# Patient Record
Sex: FEMALE | Race: BLACK OR AFRICAN AMERICAN | Employment: UNEMPLOYED | URBAN - NONMETROPOLITAN AREA
[De-identification: names, ages, dates, MRNs, and addresses within clinical notes are randomized per-mention and may not be internally consistent; named-entity substitution may affect disease eponyms.]

---

## 2022-10-26 ENCOUNTER — HOSPITAL ENCOUNTER (EMERGENCY)
Age: 32
Discharge: HOME | End: 2022-10-26
Payer: MEDICAID

## 2022-10-26 VITALS
TEMPERATURE: 97.9 F | WEIGHT: 180 LBS | HEART RATE: 68 BPM | RESPIRATION RATE: 16 BRPM | HEIGHT: 67 IN | SYSTOLIC BLOOD PRESSURE: 126 MMHG | OXYGEN SATURATION: 100 % | BODY MASS INDEX: 28.25 KG/M2 | DIASTOLIC BLOOD PRESSURE: 85 MMHG

## 2022-10-26 DIAGNOSIS — F41.1 ANXIETY STATE: ICD-10-CM

## 2022-10-26 DIAGNOSIS — G40.909 SEIZURE DISORDER (HCC): Primary | ICD-10-CM

## 2022-10-26 LAB
ALBUMIN SERPL-MCNC: 4.4 G/DL (ref 3.5–5.1)
ALP BLD-CCNC: 45 U/L (ref 38–126)
ALT SERPL-CCNC: 10 U/L (ref 11–66)
ANION GAP SERPL CALCULATED.3IONS-SCNC: 14 MEQ/L (ref 8–16)
AST SERPL-CCNC: 18 U/L (ref 5–40)
BASOPHILS # BLD: 1.5 %
BASOPHILS ABSOLUTE: 0.1 THOU/MM3 (ref 0–0.1)
BILIRUB SERPL-MCNC: 0.7 MG/DL (ref 0.3–1.2)
BUN BLDV-MCNC: 7 MG/DL (ref 7–22)
CALCIUM SERPL-MCNC: 9.4 MG/DL (ref 8.5–10.5)
CHLORIDE BLD-SCNC: 105 MEQ/L (ref 98–111)
CO2: 20 MEQ/L (ref 23–33)
CREAT SERPL-MCNC: 0.7 MG/DL (ref 0.4–1.2)
EKG ATRIAL RATE: 67 BPM
EKG P AXIS: 62 DEGREES
EKG P-R INTERVAL: 124 MS
EKG Q-T INTERVAL: 414 MS
EKG QRS DURATION: 88 MS
EKG QTC CALCULATION (BAZETT): 437 MS
EKG R AXIS: 29 DEGREES
EKG T AXIS: 43 DEGREES
EKG VENTRICULAR RATE: 67 BPM
EOSINOPHIL # BLD: 1.2 %
EOSINOPHILS ABSOLUTE: 0 THOU/MM3 (ref 0–0.4)
ERYTHROCYTE [DISTWIDTH] IN BLOOD BY AUTOMATED COUNT: 13.9 % (ref 11.5–14.5)
ERYTHROCYTE [DISTWIDTH] IN BLOOD BY AUTOMATED COUNT: 44.1 FL (ref 35–45)
GFR SERPL CREATININE-BSD FRML MDRD: > 60 ML/MIN/1.73M2
GLUCOSE BLD-MCNC: 91 MG/DL (ref 70–108)
HCT VFR BLD CALC: 38.8 % (ref 37–47)
HEMOGLOBIN: 12.7 GM/DL (ref 12–16)
IMMATURE GRANS (ABS): 0 THOU/MM3 (ref 0–0.07)
IMMATURE GRANULOCYTES: 0 %
LYMPHOCYTES # BLD: 22 %
LYMPHOCYTES ABSOLUTE: 0.7 THOU/MM3 (ref 1–4.8)
MCH RBC QN AUTO: 28.3 PG (ref 26–33)
MCHC RBC AUTO-ENTMCNC: 32.7 GM/DL (ref 32.2–35.5)
MCV RBC AUTO: 86.6 FL (ref 81–99)
MONOCYTES # BLD: 4.7 %
MONOCYTES ABSOLUTE: 0.2 THOU/MM3 (ref 0.4–1.3)
NUCLEATED RED BLOOD CELLS: 0 /100 WBC
OSMOLALITY CALCULATION: 275.1 MOSMOL/KG (ref 275–300)
PLATELET # BLD: 240 THOU/MM3 (ref 130–400)
PMV BLD AUTO: 12.2 FL (ref 9.4–12.4)
POTASSIUM REFLEX MAGNESIUM: 4.1 MEQ/L (ref 3.5–5.2)
RBC # BLD: 4.48 MILL/MM3 (ref 4.2–5.4)
SEG NEUTROPHILS: 70.6 %
SEGMENTED NEUTROPHILS ABSOLUTE COUNT: 2.4 THOU/MM3 (ref 1.8–7.7)
SODIUM BLD-SCNC: 139 MEQ/L (ref 135–145)
TOTAL PROTEIN: 7.8 G/DL (ref 6.1–8)
TROPONIN T: < 0.01 NG/ML
TSH SERPL DL<=0.05 MIU/L-ACNC: 0.73 UIU/ML (ref 0.4–4.2)
WBC # BLD: 3.4 THOU/MM3 (ref 4.8–10.8)

## 2022-10-26 PROCEDURE — 84443 ASSAY THYROID STIM HORMONE: CPT

## 2022-10-26 PROCEDURE — 85025 COMPLETE CBC W/AUTO DIFF WBC: CPT

## 2022-10-26 PROCEDURE — 84484 ASSAY OF TROPONIN QUANT: CPT

## 2022-10-26 PROCEDURE — 99284 EMERGENCY DEPT VISIT MOD MDM: CPT

## 2022-10-26 PROCEDURE — 93010 ELECTROCARDIOGRAM REPORT: CPT | Performed by: INTERNAL MEDICINE

## 2022-10-26 PROCEDURE — 80053 COMPREHEN METABOLIC PANEL: CPT

## 2022-10-26 PROCEDURE — 93005 ELECTROCARDIOGRAM TRACING: CPT | Performed by: PHYSICIAN ASSISTANT

## 2022-10-26 PROCEDURE — 36415 COLL VENOUS BLD VENIPUNCTURE: CPT

## 2022-10-26 PROCEDURE — 80177 DRUG SCRN QUAN LEVETIRACETAM: CPT

## 2022-10-26 RX ORDER — LEVETIRACETAM 1000 MG/1
1000 TABLET ORAL 2 TIMES DAILY
COMMUNITY
End: 2022-11-10 | Stop reason: SDUPTHER

## 2022-10-26 RX ORDER — LORAZEPAM 0.5 MG/1
0.5 TABLET ORAL EVERY 6 HOURS PRN
COMMUNITY
End: 2022-10-26 | Stop reason: SDUPTHER

## 2022-10-26 RX ORDER — LORAZEPAM 0.5 MG/1
0.5 TABLET ORAL EVERY 6 HOURS PRN
Qty: 6 TABLET | Refills: 0 | Status: SHIPPED | OUTPATIENT
Start: 2022-10-26 | End: 2022-10-29

## 2022-10-26 ASSESSMENT — ENCOUNTER SYMPTOMS
ABDOMINAL PAIN: 0
COUGH: 0
NAUSEA: 0
SHORTNESS OF BREATH: 0
VOMITING: 0
SORE THROAT: 0
DIARRHEA: 0
EYES NEGATIVE: 1

## 2022-10-26 ASSESSMENT — PAIN SCALES - GENERAL: PAINLEVEL_OUTOF10: 0

## 2022-10-26 NOTE — ED NOTES
Pt continues restful on cart w/family at bedside. Denies current needs or concerns.       Carter James, BANDAR  10/26/22 6289

## 2022-10-26 NOTE — ED NOTES
Called to pt room- states she feels better and she is tired of being here. She is ready to leave.  Will update provider     Ros Cortez RN  10/26/22 8956

## 2022-10-26 NOTE — DISCHARGE INSTRUCTIONS
Take Keppra as prescribed, take ativan 1-2 tablets q 6 hours as needed. Follow up with PCP in the next day for re-evaluation and management. Follow up to Neurology in the next week for further evaluation and management of seizure disorder. Recommend no driving, swimming, climbing until follow up with neurology.

## 2022-10-26 NOTE — ED TRIAGE NOTES
Pt to rm 23 per EMS for panic attack/anxiety. Pt states she kicked a rock after driving her son to school that triggered her panic attack. States she doesn't sleep well- took ativan this morning around 0300 after she stated she \"jumped\" awake- states her heart was pounding and she was having another attack. On arrival pt appears in no acute distress, VSS. States she has an appt tomorrow w/ a new PCP since she just moved here from [place] and a neuro appt next month.  Pt took her morning dose Keppra on arrival.

## 2022-10-26 NOTE — ED PROVIDER NOTES
[facility] [address] [address] EMERGENCY DEPT    EMERGENCY MEDICINE     Pt Name: Robin Dickey  MRN: [id number]  [place] 1990  Date of evaluation: 10/26/2022  Provider: Flakito Forrest PA-C    CHIEF COMPLAINT       Chief Complaint   Patient presents with    Panic Attack     States she kicked a rock and that triggered her anxiety/panic attack this morning       HISTORY OF PRESENT ILLNESS    Robin Dickey is a pleasant 33 y.o. female who presents to the emergency department for panic attack. Patient states that she has history of seizure disorder she believes is epilepsy as well as anxiety. Patient states she recently moved from [place] up to this area 2 months ago. She has yet to establish a new PCP and neurologist although she does have appointment set up to see her PCP tomorrow and a neurologist in the month of November. Patient is currently taking Keppra 1000 milligrams twice daily along with Ativan 0.5 mg every 6 hours as needed for anxiety. Patient states that very frequently she will have these feelings that she describes as \"my body is feeling shocks from the inside out\". She states this is difficult to control at times and very simple things like kicking a rock it may trigger it. She states typically the 1 tablet of Ativan will control these feelings but not all the time. She states she took a tablet this morning approximately 3 to 4 hours ago which did not seem to help. She also took her Keppra this morning. She has no complaints of chest pain, shortness of breath, weakness or paresthesias to the extremities, headaches, vision changes. Triage notes and Nursing notes were reviewed by myself. Any discrepancies are addressed above. PAST MEDICAL HISTORY     Past Medical History:   Diagnosis Date    Anxiety     Depression     Seizures ([facility] [address]        SURGICAL HISTORY     History reviewed. No pertinent surgical history.     CURRENT MEDICATIONS       Discharge Medication List as of 10/26/2022 12:38 PM        CONTINUE these medications which have NOT CHANGED    Details   levETIRAcetam (KEPPRA) 1000 MG tablet Take 1,000 mg by mouth 2 times dailyHistorical Med             ALLERGIES     Patient has no known allergies. FAMILY HISTORY     History reviewed. No pertinent family history. SOCIAL HISTORY       Social History     Socioeconomic History    Marital status: Single     Spouse name: None    Number of children: None    Years of education: None    Highest education level: None   Tobacco Use    Smoking status: Never    Smokeless tobacco: Never   Substance and Sexual Activity    Alcohol use: Never    Drug use: Yes     Types: Marijuana (Weed)     Comment: daily for anxiety       REVIEW OF SYSTEMS     Review of Systems   Constitutional:  Negative for chills and fever. HENT:  Negative for congestion, ear pain and sore throat. Eyes: Negative. Respiratory:  Negative for cough and shortness of breath. Cardiovascular:  Negative for chest pain and palpitations. Gastrointestinal:  Negative for abdominal pain, diarrhea, nausea and vomiting. Endocrine: Negative. Genitourinary:  Negative for dysuria and frequency. Musculoskeletal:  Negative for myalgias and neck pain. Skin:  Negative for rash. Neurological:  Positive for seizures. Negative for dizziness, light-headedness and headaches. Hematological: Negative. Psychiatric/Behavioral:  The patient is nervous/anxious. All other systems reviewed and are negative. Except as noted above the remainder of the review of systems was reviewed and is. SCREENINGS        Sayda Coma Scale  Eye Opening: Spontaneous  Best Verbal Response: Oriented  Best Motor Response: Obeys commands  [place] Coma Scale Score: 15                 PHYSICAL EXAM     INITIAL VITALS:  height is 5' 7\" (1.702 m) and weight is 180 lb (81.6 kg). Her oral temperature is 97.9 °F (36.6 °C). Her blood pressure is 126/85 and her pulse is 68.  Her respiration is 16 and oxygen saturation is 100%. Physical Exam  Vitals and nursing note reviewed. Exam conducted with a chaperone present. Constitutional:       General: She is not in acute distress. Appearance: Normal appearance. She is normal weight. She is not ill-appearing, toxic-appearing or diaphoretic. HENT:      Head: Normocephalic and atraumatic. Right Ear: External ear normal.      Left Ear: External ear normal.      Nose: Nose normal.      Mouth/Throat:      Mouth: Mucous membranes are moist.   Eyes:      Extraocular Movements: Extraocular movements intact. Pupils: Pupils are equal, round, and reactive to light. Cardiovascular:      Rate and Rhythm: Regular rhythm. Tachycardia present. Pulses: Normal pulses. Heart sounds: Normal heart sounds. No murmur heard. No friction rub. No gallop. Pulmonary:      Effort: Pulmonary effort is normal. No respiratory distress. Breath sounds: Normal breath sounds. Abdominal:      General: Bowel sounds are normal. There is no distension. Palpations: Abdomen is soft. Tenderness: There is no abdominal tenderness. Musculoskeletal:         General: Normal range of motion. Cervical back: Normal range of motion and neck supple. Skin:     General: Skin is warm and dry. Capillary Refill: Capillary refill takes less than 2 seconds. Neurological:      Mental Status: She is alert and oriented to person, place, and time. GCS: GCS eye subscore is 4. GCS verbal subscore is 5. GCS motor subscore is 6. Psychiatric:         Mood and Affect: Mood is anxious. Behavior: Behavior normal.         Thought Content:  Thought content normal.       DIFFERENTIAL DIAGNOSIS:   Differential diagnoses are discussed    DIAGNOSTIC RESULTS     EKG:(none if blank)  All EKGs are interpreted by the Emergency Department Physician who either signs or Co-signs this chart in the absence of a cardiologist.      Component Ref Range & Units 10/26/22 1111 Ventricular Rate BPM 67    Atrial Rate BPM 67    P-R Interval ms 124    QRS Duration ms 88    Q-T Interval ms 414    QTc Calculation (Bazett) ms 437    P Axis degrees 62    R Axis degrees 29    T Axis degrees 43    Resulting Agency  [facility] MUSE           Narrative & Impression    Normal sinus rhythm  Normal ECG  No previous ECGs available  Confirmed by Barbara Das ([id number]) on 10/26/2022 12:08:14 PM             RADIOLOGY: (none if blank)   I directly visualized the following images and reviewed the radiologist interpretations. Interpretation per the Radiologist below, if available at the time of this note:  No orders to display       LABS:   Labs Reviewed   CBC WITH AUTO DIFFERENTIAL - Abnormal; Notable for the following components:       Result Value    WBC 3.4 (*)     Lymphocytes Absolute 0.7 (*)     Monocytes Absolute 0.2 (*)     All other components within normal limits   COMPREHENSIVE METABOLIC PANEL W/ REFLEX TO MG FOR LOW K - Abnormal; Notable for the following components:    CO2 20 (*)     ALT 10 (*)     All other components within normal limits   TSH WITH REFLEX   TROPONIN   GLOMERULAR FILTRATION RATE, ESTIMATED   ANION GAP   OSMOLALITY   LEVETIRACETAM LEVEL       All other labs were within normal range or not returned as of this dictation. Please note, any cultures that may have been sent were not resulted at the time of this patient visit. EMERGENCY DEPARTMENT COURSE:   Vitals:    Vitals:    10/26/22 0933 10/26/22 0942 10/26/22 1127   BP: 138/88  126/85   Pulse: 98 94 68   Resp: 16  16   Temp: 97.9 °F (36.6 °C)     TempSrc: Oral     SpO2: 100%  100%   Weight: 180 lb (81.6 kg)     Height: 5' 7\" (1.702 m)       7:09 PM EDT: The patient was seen and evaluated. PROCEDURES: (None if blank)  Procedures         ED Medications administered this visit:  Medications - No data to display    MDM:  Patient is 41-year-old female who came to the ED to be evaluated for anxiety, seizure disorder.   Appropriate testing/imaging of CBC, CMP, troponin, TSH, levetiracetam level EKG was done based on the patient's initial complaints, history, and physical exam.   Pertinent results were none. When patient was initially been evaluated she seemed very anxious and her heart rate was in the low 100s. Patient states that her body feels like it needed to jump or be shocked from the inside out. Patient had an episode where her heart rate went up to 140s for less than 20 seconds and then drop back down into the 70s. After her heart rate was in the 70s patient no longer appeared anxious and was at calm and rest.  Believe this may be more related to her seizure disorder. Patient did take another half tablet of her own 0.5 mg Ativan and was observed in the ED. Patient states she is back to her baseline and no current concerns or symptoms. Discussed with patient this is likely related to her seizure disorder and recommend follow-up with neurologist in the next week for reevaluation and possible change in her medication management. We will see her PCP tomorrow for reevaluation as well. If any worsening symptoms develop to the ED for reevaluation. Patient was seen independently by myself. The patient's final impression and disposition and plan was determined by myself. Strict return precautions and follow up instructions were discussed with the patient prior to discharge, with which the patient agrees. Physical assessment findings, diagnostic testing(s) if applicable, and vital signs reviewed with patient/patient representative. Questions answered. Medications as directed, including OTC medications for supportive care. Education provided on medications. Differential diagnosis(s) discussed with patient/patient representative. Home care/self care instructions reviewed with patient/patient representative. Patient is to follow-up with family care provider in 1 days if no improvement.   Patient is to go to the emergency department if symptoms worsen. Patient/patient representative is aware of care plan, questions answered, verbalizes understanding and is in agreement. CRITICAL CARE:   None    CONSULTS:  None    PROCEDURES:  None    FINAL IMPRESSION      1. Seizure disorder ([address]    2.  Anxiety state          DISPOSITION/PLAN   Discharge home    PATIENT REFERRED TO:  PCP    In 1 day  For follow up    [address] [facility] [address]  [address] [facility] [place] [address]  In 1 week      [facility] [address] [address] EMERGENCY DEPT  [address]  [phone]  In 2 days  If symptoms worsen      DISCHARGEMEDICATIONS:  Discharge Medication List as of 10/26/2022 12:38 PM               (Please note that portions of this note were completed with a voice recognition program.  Efforts were made to edit the dictations but occasionallywords are mis-transcribed.)      Mitch Yun PA-C(electronically signed)  Physician Associate, Emergency Department        Mitch Yun PA-C  10/26/22 5932

## 2022-10-28 LAB — KEPPRA: 23 UG/ML (ref 10–40)

## 2022-11-10 ENCOUNTER — OFFICE VISIT (OUTPATIENT)
Dept: INTERNAL MEDICINE CLINIC | Age: 32
End: 2022-11-10
Payer: MEDICAID

## 2022-11-10 VITALS
HEART RATE: 92 BPM | SYSTOLIC BLOOD PRESSURE: 110 MMHG | BODY MASS INDEX: 22.51 KG/M2 | HEIGHT: 67 IN | WEIGHT: 143.4 LBS | TEMPERATURE: 98 F | DIASTOLIC BLOOD PRESSURE: 70 MMHG

## 2022-11-10 DIAGNOSIS — F41.0 PANIC DISORDER WITHOUT AGORAPHOBIA: ICD-10-CM

## 2022-11-10 DIAGNOSIS — Z86.69 HISTORY OF SEIZURE DISORDER: Primary | ICD-10-CM

## 2022-11-10 PROCEDURE — 99204 OFFICE O/P NEW MOD 45 MIN: CPT | Performed by: STUDENT IN AN ORGANIZED HEALTH CARE EDUCATION/TRAINING PROGRAM

## 2022-11-10 RX ORDER — LORAZEPAM 0.5 MG/1
TABLET ORAL
Qty: 10 TABLET | Refills: 0 | Status: SHIPPED | OUTPATIENT
Start: 2022-11-10 | End: 2022-12-10

## 2022-11-10 RX ORDER — FLUVOXAMINE MALEATE 100 MG
100 TABLET ORAL NIGHTLY
Qty: 30 TABLET | Refills: 1 | Status: SHIPPED | OUTPATIENT
Start: 2022-11-10

## 2022-11-10 RX ORDER — LEVETIRACETAM 1000 MG/1
1000 TABLET ORAL 2 TIMES DAILY
Qty: 60 TABLET | Refills: 3 | Status: SHIPPED | OUTPATIENT
Start: 2022-11-10

## 2022-11-10 RX ORDER — LORAZEPAM 0.5 MG/1
0.5 TABLET ORAL EVERY 6 HOURS PRN
COMMUNITY
End: 2022-11-10 | Stop reason: ALTCHOICE

## 2022-11-10 SDOH — ECONOMIC STABILITY: FOOD INSECURITY: WITHIN THE PAST 12 MONTHS, THE FOOD YOU BOUGHT JUST DIDN'T LAST AND YOU DIDN'T HAVE MONEY TO GET MORE.: NEVER TRUE

## 2022-11-10 SDOH — ECONOMIC STABILITY: FOOD INSECURITY: WITHIN THE PAST 12 MONTHS, YOU WORRIED THAT YOUR FOOD WOULD RUN OUT BEFORE YOU GOT MONEY TO BUY MORE.: NEVER TRUE

## 2022-11-10 ASSESSMENT — PATIENT HEALTH QUESTIONNAIRE - PHQ9
SUM OF ALL RESPONSES TO PHQ QUESTIONS 1-9: 2
SUM OF ALL RESPONSES TO PHQ QUESTIONS 1-9: 2
1. LITTLE INTEREST OR PLEASURE IN DOING THINGS: 1
2. FEELING DOWN, DEPRESSED OR HOPELESS: 1
SUM OF ALL RESPONSES TO PHQ QUESTIONS 1-9: 2
SUM OF ALL RESPONSES TO PHQ QUESTIONS 1-9: 2
SUM OF ALL RESPONSES TO PHQ9 QUESTIONS 1 & 2: 2

## 2022-11-10 ASSESSMENT — SOCIAL DETERMINANTS OF HEALTH (SDOH): HOW HARD IS IT FOR YOU TO PAY FOR THE VERY BASICS LIKE FOOD, HOUSING, MEDICAL CARE, AND HEATING?: NOT VERY HARD

## 2022-11-10 NOTE — PROGRESS NOTES
[facility] Internal Medicine  [facility] [address], Andreea Amador  Dept: [phone]  Dept Fax: [phone]    This is a new patient, here for an evaluation of the following chief complaint(s):  New Patient (Establish care - panic attack / anxiety , seizure disorder / patient was in ER 10/26)    ASSESSMENT AND PLAN     1. History of seizure disorder    2. Panic disorder without agoraphobia      # History of Seizures -Patient reports seizures occurred when she was in high school. Was managed by a Neurologist in [place]. She has been on Keppra therapy. No report of recent breakthrough seizures. Prior Tx/Therapy:   Current Tx/Therapy: Keppra 1,000 mg BID    -She was supposed to establish care here with [facility] neurology with her first appointment being on 11/9/2022. Per chart review this appointment was canceled by the patient.  -Patient however states the neurology office had canceled the appointment and told her that the next available appointment is in January 2023. Patient does not have a follow-up appointment per chart review.  -Heavily encouraged patient to establish care with neurology here for better monitoring and control of her seizure disorder  -Patient only had 2 to 3 pills left and so I will prescribe the Keppra with refills to last her until she goes to neurology    Plan: Refills sent for Keppra 1000 mg BID. Patient advised to schedule another visit to establish care with Neurology. # Panic Disorder w/o Agoraphobia -patient reports having panic attacks 2-3 times a month. Reports constant high levels of anxiety. She states that she was prescribed Ativan while in [place]. She was given a 3-day supply of Ativan after visiting the ED on 10/26/2022. Prior Tx/Therapy:   Current Tx/Therapy: Ativan 0.5 mg PRN    -PDMP reviewed  -Patient heavily encouraged to establish care here with psychiatry and/or [facility].   Discussed with the patient that I will only prescribe 10 pills/month and that this should be taken only when she is undergoing a panic attack. Patient verbalized understanding and is in agreement with this plan. -Furthermore I discussed that SSRI therapy is the mainstay of treatment in patients with underlying panic disorder/anxiety. Discussed fluvoxamine with the patient. Side effects of SSRI therapy discussed. Patient is in agreement and verbalized understanding of starting SSRI therapy with fluvoxamine. Plan: Patient will start Fluvoxamine 100 mg to be taken nightly. Will prescribe Ativan, 0.5 mg tablets, total monthly supply of 10 tablets, to be taken as needed for panic attacks. New Prescriptions    FLUVOXAMINE (LUVOX) 100 MG TABLET    Take 1 tablet by mouth nightly    LORAZEPAM (ATIVAN) 0.5 MG TABLET    Take 1 tablet as needed for panic attacks     The risks and benefits of my recommendations, as well as other treatment options (if indicated) were discussed with the patient and boyfriend today. Questions were answered. Follow up: 1 month and as needed. MARGO Mart (1990) is a pleasant 33 y.o. female here for establishment of new care. Subjective  Patient and her boyfriend recently moved here from [address] [place] [address]  Seizure disoder - says that she gets mini seizures at times. Started in high school. Saw a neurologist in [address] [place] [address]. Missed the appt yestereday. She did not reschedule yet. Gets panic attacks when she thinks too much. Gets 1-2 panic attacks a month. PMH, surgical, family and social history reviewed with patient  No recurrent headaches, no CP. Denies symptoms of shortness of breath, abdominal pain, N/V/C/D.   Patient advised to schedule appointments with both neurology and with psychiatry    Current Medications:  Outpatient Medications Marked as Taking for the 11/10/22 encounter (Office Visit) with Wilda Craven MD   Medication Sig Dispense Refill    levETIRAcetam (KEPPRA) 1000 MG tablet Take 1 tablet by mouth 2 times daily 60 tablet 3    fluvoxaMINE (LUVOX) 100 MG tablet Take 1 tablet by mouth nightly 30 tablet 1    LORazepam (ATIVAN) 0.5 MG tablet Take 1 tablet as needed for panic attacks 10 tablet 0     PMH: has a past medical history of Anxiety, Depression, and Seizures ([facility] [address] PSH: has no past surgical history on file. FH:family history is not on file. SH: reports that she has never smoked. She has never used smokeless tobacco. She reports current drug use. Drug: Marijuana Braxton Goldberg). She reports that she does not drink alcohol. Allergies:has No Known Allergies. PHYSICAL EXAMINATION     Vitals:    11/10/22 1527   BP: 110/70   Pulse: 92   Temp: 98 °F (36.7 °C)     Body mass index is 22.46 kg/m². General appearance: alert, well appearing, and in no distress. Head; normocephalic, atraumatic. DOT. ENT- ENT exam normal, no neck nodes or sinus tenderness. Oropharyngeal exam - mucous membranes moist, pharynx normal without lesions. Neck exam - supple, no significant adenopathy. CVS exam: normal rate, regular rhythm, normal S1, S2, no murmurs, rubs, clicks or gallops. Chest: clear to auscultation, no wheezes, rales or rhonchi, symmetric air entry. Abdominal exam: soft, nontender, nondistended, no masses or organomegaly. Exam of extremities: peripheral pulses normal, no pedal edema, no clubbing or cyanosis  Musculoskeletal exam: no joint tenderness, deformity or swelling. Neurological exam reveals alert, oriented, normal speech, no focal findings or movement disorder noted. Skin exam - normal coloration and turgor, no rashes, no suspicious skin lesions noted. Mental Status: alert, oriented to person, place, and time. MOST RECENT DIAGNOSTIC DATA     The Following Labs Were Reviewed Today:  N/A    No results found for this or any previous visit (from the past 24 hour(s)). Radiology last 30 days:  No results found.     Electronically signed by Jesusita Gomez MD on 11/10/22 at 7:09 PM EST  Internal Medicine Resident PGY-3  [facility]  Neerujody Van, [address]  Case and plan reviewed with attending physician, Dr. Dago Chacon, DO

## 2023-01-07 DIAGNOSIS — F41.0 PANIC DISORDER WITHOUT AGORAPHOBIA: ICD-10-CM

## 2023-01-09 RX ORDER — FLUVOXAMINE MALEATE 100 MG
100 TABLET ORAL NIGHTLY
Qty: 30 TABLET | Refills: 1 | OUTPATIENT
Start: 2023-01-09

## 2023-02-08 DIAGNOSIS — F41.0 PANIC DISORDER WITHOUT AGORAPHOBIA: ICD-10-CM

## 2023-02-09 RX ORDER — FLUVOXAMINE MALEATE 100 MG
100 TABLET ORAL NIGHTLY
Qty: 30 TABLET | Refills: 1 | Status: SHIPPED | OUTPATIENT
Start: 2023-02-09

## 2023-02-09 NOTE — TELEPHONE ENCOUNTER
Refill request received    Date last ordered 11/10/22, disp #30, refill 1    Date of last visit 11/14/22 Dr. Steven Andrews

## 2023-03-02 DIAGNOSIS — Z86.69 HISTORY OF SEIZURE DISORDER: ICD-10-CM

## 2023-03-02 RX ORDER — LEVETIRACETAM 1000 MG/1
TABLET ORAL
Qty: 60 TABLET | Refills: 3 | OUTPATIENT
Start: 2023-03-02

## 2023-08-02 ENCOUNTER — OFFICE VISIT (OUTPATIENT)
Dept: NEUROLOGY | Age: 33
End: 2023-08-02
Payer: COMMERCIAL

## 2023-08-02 ENCOUNTER — NURSE ONLY (OUTPATIENT)
Dept: LAB | Age: 33
End: 2023-08-02

## 2023-08-02 VITALS
SYSTOLIC BLOOD PRESSURE: 115 MMHG | HEIGHT: 67 IN | BODY MASS INDEX: 19.93 KG/M2 | HEART RATE: 80 BPM | OXYGEN SATURATION: 99 % | WEIGHT: 127 LBS | DIASTOLIC BLOOD PRESSURE: 70 MMHG

## 2023-08-02 DIAGNOSIS — R56.9 SEIZURE (HCC): Primary | ICD-10-CM

## 2023-08-02 DIAGNOSIS — R56.9 SEIZURE (HCC): ICD-10-CM

## 2023-08-02 LAB
FOLATE SERPL-MCNC: 5 NG/ML (ref 4.8–24.2)
VIT B12 SERPL-MCNC: 566 PG/ML (ref 211–911)

## 2023-08-02 PROCEDURE — 99205 OFFICE O/P NEW HI 60 MIN: CPT | Performed by: PSYCHIATRY & NEUROLOGY

## 2023-08-02 RX ORDER — ESCITALOPRAM OXALATE 5 MG/1
5 TABLET ORAL DAILY
COMMUNITY

## 2023-08-02 NOTE — PATIENT INSTRUCTIONS
Obtain records from previous neurologist, Dr. Earline Prakash and Nino Mckeon, [address] [facility] [address] in [facility] afb, [address] with Keppra at current dose  EEG  Keppra level  Vitamin B12, folate  Vitamin B6 level  No driving, swimming, operating heavy machinery or compromising heights until event free for 6 months. Report any new events. Call if any questions. Call with any new symptoms or concerns. Follow up in 6 weeks.

## 2023-08-03 LAB — LEVETIRACETAM SERPL-MCNC: 7 UG/ML (ref 10–40)

## 2023-08-04 ENCOUNTER — TELEPHONE (OUTPATIENT)
Dept: NEUROLOGY | Age: 33
End: 2023-08-04

## 2023-08-04 NOTE — TELEPHONE ENCOUNTER
----- Message from MELL Campbell CNP sent at 8/4/2023  7:42 AM EDT -----  Please let patient know her keppra level is low=7. She needs to be taking the keppra 1000 mg two times a day. No driving, swimming, operating heavy machinery or compromising heights until cleared. Report any new events.  Call if any questions  Please ask her to take consistently and repeat keppra level in 1 week  Trish Jensen CNP

## 2023-08-05 LAB — PYRIDOXAL PHOS SERPL-SCNC: 15.9 NMOL/L (ref 20–125)

## 2023-08-07 ENCOUNTER — TELEPHONE (OUTPATIENT)
Dept: NEUROLOGY | Age: 33
End: 2023-08-07

## 2023-08-07 NOTE — TELEPHONE ENCOUNTER
----- Message from MELL Terrell CNP sent at 8/7/2023  9:08 AM EDT -----  Please let patient know her vitamin B6 level is low=15.9. She needs to start on vitamin B6 supplements 50 mg daily, over the counter.   Please have her call the office in 2-3 months to obtain repeat vitamin B6 level  Kirill Almendarez CNP

## 2023-08-09 ENCOUNTER — HOSPITAL ENCOUNTER (OUTPATIENT)
Dept: NEUROLOGY | Age: 33
Discharge: HOME | End: 2023-08-09
Payer: COMMERCIAL

## 2023-08-09 DIAGNOSIS — R56.9 SEIZURE (HCC): ICD-10-CM

## 2023-08-09 PROCEDURE — 95816 EEG AWAKE AND DROWSY: CPT

## 2023-08-09 NOTE — PROGRESS NOTES
symptoms and work up recommended, she was also counseled about medication options. Symptoms would be epileptic vs non epileptic, with need to assess compliance if epileptic events. We will obtain EEG to screen for cortical irritability and a keppra level to ensure compliance. She does not drive. She was asked to refrain from   driving, swimming, operating heavy machinery or compromising heights until event cleared. After detailed discussion with patient we agreed on the following plan. Plan    Obtain records from previous neurologist, Dr. Arnie Bailey and Rohit Dorman, [address] [place] [address] [address] in [facility], [address] with Keppra at current dose  EEG  Keppra level  Vitamin B12, folate  Vitamin B6 level  No driving, swimming, operating heavy machinery or compromising heights until event free for 6 months. Report any new events. Call if any questions. Call with any new symptoms or concerns. Follow up in 6 weeks.      Total time 64 min    Jeanie Ha MD

## 2023-08-09 NOTE — PROGRESS NOTES
[address] [facility] Laboratory Technician worksheet       EEG Date: 2023    Name: Gabi Kenney   : 1990   Age: 33 y.o. SEX: female    Room: op    MRN: [id number]     CSN: [id number]    Ordering Provider: leopold  EEG Number: [id number] Time of Test:  3709    Hand: Right   Sedation: No    H.V. Done: Yes with fair effort Photic: Yes    Sleep: No   Drowsy: No   Sleep Deprived: No    Seizures observed: no    Mentality: alert       Clinical History: new to area, establish local care, hx of seizure, l arm numbness with convulsions, no images available      Past Medical History:       Diagnosis Date    Anxiety     Depression     Hearing loss     Bilateral    Seizures ([address])          Prior to Admission medications    Medication Sig Start Date End Date Taking?  Authorizing Provider   escitalopram (LEXAPRO) 5 MG tablet Take 1 tablet by mouth daily    Historical Provider, MD   levETIRAcetam (KEPPRA) 1000 MG tablet Take 1 tablet by mouth 2 times daily  Patient taking differently: Take 1 tablet by mouth Patient was prescribed to take 1 1/2 tablets by mouth once daily  ONLY TAKING 1 tablet DAILY 11/10/22   Lannette Buerger, MD       Technician: Audie Martin 2023

## 2023-08-24 NOTE — PATIENT INSTRUCTIONS
Please follow up with Neurology for your seizure disorder. Please get an appointment with Psychiatry for your panic attacks. We will not prescribe more than 10 tablets of Ativan per month. Render Risk Assessment In Note?: no Additional Notes: Patient consent was obtained to proceed with the visit and recommended plan of care after discussion of all risks and benefits, including the risks of COVID-19 exposure.\\n\\n Detail Level: Simple

## 2023-08-29 DIAGNOSIS — Z86.69 HISTORY OF SEIZURE DISORDER: ICD-10-CM

## 2023-08-29 RX ORDER — LEVETIRACETAM 1000 MG/1
1000 TABLET ORAL 2 TIMES DAILY
Qty: 60 TABLET | Refills: 1 | Status: SHIPPED | OUTPATIENT
Start: 2023-08-29

## 2023-08-29 NOTE — TELEPHONE ENCOUNTER
Spoke with patient who stated she will get repeat Keppra level drawn. Patient uses [facility] lab.

## 2023-10-02 DIAGNOSIS — Z86.69 HISTORY OF SEIZURE DISORDER: ICD-10-CM

## 2023-10-02 RX ORDER — LEVETIRACETAM 1000 MG/1
1000 TABLET ORAL 2 TIMES DAILY
Qty: 60 TABLET | Refills: 1 | Status: SHIPPED | OUTPATIENT
Start: 2023-10-02

## 2023-10-02 NOTE — TELEPHONE ENCOUNTER
Reminded patient to complete Keppra level as previously discussed. Patient verbalized understanding.

## 2023-11-30 DIAGNOSIS — Z86.69 HISTORY OF SEIZURE DISORDER: ICD-10-CM

## 2023-11-30 RX ORDER — LEVETIRACETAM 1000 MG/1
1000 TABLET ORAL 2 TIMES DAILY
Qty: 60 TABLET | Refills: 1 | Status: SHIPPED | OUTPATIENT
Start: 2023-11-30

## 2023-11-30 NOTE — TELEPHONE ENCOUNTER
Reminded patient to complete Keppra level as previously discussed. Patient stated she is having transportation issues. Patient verbalized understanding.

## 2023-12-06 ENCOUNTER — HOSPITAL ENCOUNTER (OUTPATIENT)
Age: 33
Discharge: HOME | End: 2023-12-06
Payer: COMMERCIAL

## 2023-12-06 DIAGNOSIS — R56.9 SEIZURE (HCC): ICD-10-CM

## 2023-12-06 LAB — KEPPRA: 40 UG/ML

## 2023-12-06 PROCEDURE — 80177 DRUG SCRN QUAN LEVETIRACETAM: CPT

## 2023-12-06 PROCEDURE — 36415 COLL VENOUS BLD VENIPUNCTURE: CPT

## 2024-01-31 DIAGNOSIS — Z86.69 HISTORY OF SEIZURE DISORDER: ICD-10-CM

## 2024-01-31 RX ORDER — LEVETIRACETAM 1000 MG/1
1000 TABLET ORAL 2 TIMES DAILY
Qty: 60 TABLET | Refills: 1 | Status: SHIPPED | OUTPATIENT
Start: 2024-01-31

## 2024-01-31 NOTE — TELEPHONE ENCOUNTER
Please approve or deny     Last Visit Date:  8/2/2023        Last Levetiracetam level completed 12/06/2023= 40    Next Visit Date:    2/27/202  Paige Leopold

## 2024-03-13 DIAGNOSIS — Z86.69 HISTORY OF SEIZURE DISORDER: ICD-10-CM

## 2024-03-13 RX ORDER — LEVETIRACETAM 1000 MG/1
1000 TABLET ORAL 2 TIMES DAILY
Qty: 60 TABLET | Refills: 1 | Status: SHIPPED | OUTPATIENT
Start: 2024-03-13

## 2024-03-13 NOTE — TELEPHONE ENCOUNTER
Ira Downey called requesting a refill on the following medications:  Requested Prescriptions     Pending Prescriptions Disp Refills    levETIRAcetam (KEPPRA) 1000 MG tablet 60 tablet 1     Sig: Take 1 tablet by mouth 2 times daily       Date of last visit: 8/2/2023- Dr. Pandey  Date of next visit (if applicable):4/18/24- Paige Leopold, CNP  Date of last refill: 1/31/24  Pharmacy Name: [facility]      Thanks,  Shefali Villagomez LPN

## 2024-05-20 DIAGNOSIS — Z86.69 HISTORY OF SEIZURE DISORDER: ICD-10-CM

## 2024-05-20 RX ORDER — LEVETIRACETAM 1000 MG/1
1000 TABLET ORAL 2 TIMES DAILY
Qty: 60 TABLET | Refills: 5 | Status: SHIPPED | OUTPATIENT
Start: 2024-05-20

## 2024-05-20 NOTE — TELEPHONE ENCOUNTER
Ira Downey called requesting a refill on the following medications:  Requested Prescriptions     Pending Prescriptions Disp Refills    levETIRAcetam (KEPPRA) 1000 MG tablet 60 tablet 1     Sig: Take 1 tablet by mouth 2 times daily       Date of last visit: 8/2/2023- Dr. Pandey  Date of next visit (if applicable):6/7/24- Paige Leopold, CNP  Date of last refill: 3/13/24  Pharmacy Name:  [facility]      Thanks,  Shefali Villagomez LPN

## 2024-10-04 DIAGNOSIS — Z86.69 HISTORY OF SEIZURE DISORDER: ICD-10-CM

## 2024-10-04 RX ORDER — LEVETIRACETAM 1000 MG/1
1000 TABLET ORAL 2 TIMES DAILY
Qty: 60 TABLET | Refills: 0 | Status: SHIPPED | OUTPATIENT
Start: 2024-10-04

## 2024-10-04 NOTE — TELEPHONE ENCOUNTER
Patient called requesting a refill of medication.     Advised the patient she has not been seen since 8/02/2023. She has cancelled 7 appointments and no showed 1 since last being seen.   Patient agreeable to schedule an appointment. Advised I would send a message to provider regarding medication refill as she is out of medication tomorrow.     Levetiracetam Level 12/6/23 = 40    Please approve or deny     Last Visit Date:  8/2/2023       Next Visit Date:    Visit date not found

## 2024-12-20 DIAGNOSIS — Z86.69 HISTORY OF SEIZURE DISORDER: ICD-10-CM

## 2024-12-20 RX ORDER — LEVETIRACETAM 1000 MG/1
1000 TABLET ORAL 2 TIMES DAILY
Qty: 60 TABLET | Refills: 0 | Status: SHIPPED | OUTPATIENT
Start: 2024-12-20

## 2024-12-20 NOTE — TELEPHONE ENCOUNTER
Ira Downey called requesting a refill on the following medications:  Requested Prescriptions     Pending Prescriptions Disp Refills    levETIRAcetam (KEPPRA) 1000 MG tablet 60 tablet 0     Sig: Take 1 tablet by mouth 2 times daily       Date of last visit: 8/2/2023  Date of next visit (if applicable):Visit date not found  Date of last refill: 10/4/24  Pharmacy Name: [facility]      Thanks,  Shefali Villagomez LPN

## 2025-01-21 DIAGNOSIS — Z86.69 HISTORY OF SEIZURE DISORDER: ICD-10-CM

## 2025-01-21 RX ORDER — LEVETIRACETAM 1000 MG/1
1000 TABLET ORAL 2 TIMES DAILY
Qty: 60 TABLET | Refills: 0 | Status: SHIPPED | OUTPATIENT
Start: 2025-01-21

## 2025-03-18 ENCOUNTER — HOSPITAL ENCOUNTER (EMERGENCY)
Age: 35
Discharge: HOME | End: 2025-03-18
Attending: EMERGENCY MEDICINE
Payer: COMMERCIAL

## 2025-03-18 ENCOUNTER — TELEPHONE (OUTPATIENT)
Dept: NEUROSURGERY | Age: 35
End: 2025-03-18

## 2025-03-18 VITALS
HEIGHT: 67 IN | RESPIRATION RATE: 16 BRPM | HEART RATE: 58 BPM | WEIGHT: 120 LBS | SYSTOLIC BLOOD PRESSURE: 126 MMHG | BODY MASS INDEX: 18.83 KG/M2 | OXYGEN SATURATION: 98 % | TEMPERATURE: 97.8 F | DIASTOLIC BLOOD PRESSURE: 79 MMHG

## 2025-03-18 DIAGNOSIS — Z86.69 HISTORY OF SEIZURE DISORDER: ICD-10-CM

## 2025-03-18 DIAGNOSIS — R56.9 SEIZURE (HCC): Primary | ICD-10-CM

## 2025-03-18 LAB
ANION GAP SERPL CALC-SCNC: 11 MEQ/L (ref 8–16)
B-HCG SERPL QL: NEGATIVE
BASOPHILS ABSOLUTE: 0 THOU/MM3 (ref 0–0.1)
BASOPHILS NFR BLD AUTO: 0.9 %
BUN SERPL-MCNC: 9 MG/DL (ref 8–23)
CALCIUM SERPL-MCNC: 9.2 MG/DL (ref 8.6–10)
CHLORIDE SERPL-SCNC: 104 MEQ/L (ref 98–111)
CO2 SERPL-SCNC: 24 MEQ/L (ref 22–29)
CREAT SERPL-MCNC: 0.8 MG/DL (ref 0.5–0.9)
DEPRECATED RDW RBC AUTO: 43.5 FL (ref 35–45)
EKG ATRIAL RATE: 59 BPM
EKG P AXIS: 50 DEGREES
EKG P-R INTERVAL: 106 MS
EKG Q-T INTERVAL: 412 MS
EKG QRS DURATION: 76 MS
EKG QTC CALCULATION (BAZETT): 407 MS
EKG R AXIS: 46 DEGREES
EKG T AXIS: 50 DEGREES
EKG VENTRICULAR RATE: 59 BPM
EOSINOPHIL NFR BLD AUTO: 3.2 %
EOSINOPHILS ABSOLUTE: 0.1 THOU/MM3 (ref 0–0.4)
ERYTHROCYTE [DISTWIDTH] IN BLOOD BY AUTOMATED COUNT: 14 % (ref 11.5–14.5)
GFR SERPL CREATININE-BSD FRML MDRD: > 90 ML/MIN/1.73M2
GLUCOSE SERPL-MCNC: 76 MG/DL (ref 74–109)
HCT VFR BLD AUTO: 35.9 % (ref 37–47)
HGB BLD-MCNC: 11.8 GM/DL (ref 12–16)
IMM GRANULOCYTES # BLD AUTO: 0.01 THOU/MM3 (ref 0–0.07)
IMM GRANULOCYTES NFR BLD AUTO: 0.2 %
LYMPHOCYTES ABSOLUTE: 1.6 THOU/MM3 (ref 1–4.8)
LYMPHOCYTES NFR BLD AUTO: 35.7 %
MCH RBC QN AUTO: 28 PG (ref 26–33)
MCHC RBC AUTO-ENTMCNC: 32.9 GM/DL (ref 32.2–35.5)
MCV RBC AUTO: 85.3 FL (ref 81–99)
MONOCYTES ABSOLUTE: 0.4 THOU/MM3 (ref 0.4–1.3)
MONOCYTES NFR BLD AUTO: 8.6 %
NEUTROPHILS ABSOLUTE: 2.3 THOU/MM3 (ref 1.8–7.7)
NEUTROPHILS NFR BLD AUTO: 51.4 %
NRBC BLD AUTO-RTO: 0 /100 WBC
OSMOLALITY SERPL CALC.SUM OF ELEC: 275 MOSMOL/KG (ref 275–300)
PLATELET # BLD AUTO: 222 THOU/MM3 (ref 130–400)
PMV BLD AUTO: 10.7 FL (ref 9.4–12.4)
POTASSIUM SERPL-SCNC: 4.6 MEQ/L (ref 3.5–5.2)
RBC # BLD AUTO: 4.21 MILL/MM3 (ref 4.2–5.4)
SODIUM SERPL-SCNC: 139 MEQ/L (ref 135–145)
T4 FREE SERPL-MCNC: 1 NG/DL (ref 0.92–1.68)
TSH SERPL DL<=0.05 MIU/L-ACNC: 0.82 UIU/ML (ref 0.27–4.2)
WBC # BLD AUTO: 4.4 THOU/MM3 (ref 4.8–10.8)

## 2025-03-18 PROCEDURE — 80177 DRUG SCRN QUAN LEVETIRACETAM: CPT

## 2025-03-18 PROCEDURE — 84703 CHORIONIC GONADOTROPIN ASSAY: CPT

## 2025-03-18 PROCEDURE — 93010 ELECTROCARDIOGRAM REPORT: CPT | Performed by: INTERNAL MEDICINE

## 2025-03-18 PROCEDURE — 85025 COMPLETE CBC W/AUTO DIFF WBC: CPT

## 2025-03-18 PROCEDURE — 6370000000 HC RX 637 (ALT 250 FOR IP): Performed by: EMERGENCY MEDICINE

## 2025-03-18 PROCEDURE — 36415 COLL VENOUS BLD VENIPUNCTURE: CPT

## 2025-03-18 PROCEDURE — 84439 ASSAY OF FREE THYROXINE: CPT

## 2025-03-18 PROCEDURE — 93005 ELECTROCARDIOGRAM TRACING: CPT

## 2025-03-18 PROCEDURE — 84443 ASSAY THYROID STIM HORMONE: CPT

## 2025-03-18 PROCEDURE — 80048 BASIC METABOLIC PNL TOTAL CA: CPT

## 2025-03-18 PROCEDURE — 99284 EMERGENCY DEPT VISIT MOD MDM: CPT

## 2025-03-18 RX ORDER — ESCITALOPRAM OXALATE 5 MG/1
5 TABLET ORAL DAILY
Qty: 30 TABLET | Refills: 0 | Status: SHIPPED | OUTPATIENT
Start: 2025-03-18

## 2025-03-18 RX ORDER — LEVETIRACETAM 500 MG/1
1000 TABLET ORAL ONCE
Status: COMPLETED | OUTPATIENT
Start: 2025-03-18 | End: 2025-03-18

## 2025-03-18 RX ORDER — LEVETIRACETAM 1000 MG/1
1000 TABLET ORAL 2 TIMES DAILY
Qty: 60 TABLET | Refills: 0 | Status: SHIPPED | OUTPATIENT
Start: 2025-03-18

## 2025-03-18 RX ADMIN — LEVETIRACETAM 1000 MG: 500 TABLET, FILM COATED ORAL at 13:19

## 2025-03-18 ASSESSMENT — PAIN - FUNCTIONAL ASSESSMENT: PAIN_FUNCTIONAL_ASSESSMENT: NONE - DENIES PAIN

## 2025-03-18 NOTE — TELEPHONE ENCOUNTER
Patient called the Neurosurgery office stating she is needing a refill of her Keppra. Tried explaining to the patient, she is not a patient of Neurosurgery's and I would have to transfer her to Dr. Pandey's office for this refill request. Patient stated she was told she was no longer a patient of Dr. Smith's. Explained to the patient I would have to take a look into her chart to see why she is no longer a patient. While looking in the chart, the patient expressed how upset she was, she raises 3 children on her own and she cannot go on without her Keppra and she is tired of not being listened to and she will make is known she is not going to keep her mouth shut anymore. Explained to the patient I will call over to Neurology and find out exactly the status of this. Spoke to Neuruology and the patient has been dismissed and it is up to the patient to find a new neurology office to fill this keppra. In the meantime the patient hung up the phone. Attempted to call the patient back. Patient did not answer. Voicemail was left for the patient to call back if she has any future questions or concerns.

## 2025-03-18 NOTE — PROGRESS NOTES
Medication history completed telephonically and obtained by Pharmacy Student Santa Schmidt and was completed based on information available during current patient encounter.     Allergies: Patient has no known allergies.    Prior to Admission Medications       Med List Status: Complete Set By: Santa Schmidt at 03/18/2025  1:55 PM          Taking? Last Dose Informant Start Date End Date Provider LT     escitalopram (LEXAPRO) 5 MG tablet  3/18/2025 Self  03/18/25  --  Dereck Velez, DO      Take 1 tablet by mouth daily     levETIRAcetam (KEPPRA) 1000 MG tablet  3/18/2025 Self  03/18/25  --  Dereck Velez, DO      Take 1 tablet by mouth 2 times daily       --  --          --  --             Medication History Obtained From:  Patient/Patient Med List/Chart Review  Care Everywhere  Patient stated she takes medications as above. Patient stated that she takes keppra twice a day but was told to only take it once a day in Feb. Patient is taking keppra 1000 mg twice daily.    Medication History Summary  Medications ADDED: 0 -   Medications FLAGGED FOR REMOVAL: 2 -   Medications MODIFIED: 1 -   Medications TAKING DIFFERENTLY THAN PRESCRIBED: 0 -   Total Med List Updates Made: 3  Time Spent:  10  minutes    Santa Schmidt  3/18/2025 1:56 PM

## 2025-03-18 NOTE — DISCHARGE INSTRUCTIONS
You were seen in the emergency department for seizure-like activity.  You do have a history of seizures and requesting refill of your medications as you are in the process of following up with your primary care doctor and incision for neurologist.  You encouraged to take your medications as prescribed by your neurologist.  You are also encouraged to reduce how much cannabis intake to see if this reduces the number of seizures you experience.  If symptoms worsen and/or you are unable to follow-up with your primary care provider and/or neurologist, do not hesitate to call 911 and/or return to the ED.

## 2025-03-18 NOTE — ED NOTES
Assumed pt care. Pt states she had a seizure this morning that she thinks lasted 15 minutes. Pt states her lt arm and leg will go numb and start twitching. Pt states when she came to she had a headache. Pt states that is normal for her seizure activity and postictal stage. Pt states she is running low on her medications and can't see her doctor until Friday and doesn't have enough to last her. Pt took last dose this morning. Pt a & o x 4. Resp regular. SR up x 2. Call light in reach.

## 2025-03-18 NOTE — ED PROVIDER NOTES
[facility]  EMERGENCY DEPARTMENT ENCOUNTER          Pt Name: Ira Downey  MRN: [id number]  Birthdate 1990  Date of evaluation: 3/18/2025  Treating Resident Physician: Anthony Ramirez MD  Supervising Physician: Dereck Velez DO    History obtained from the patient.     CHIEF COMPLAINT       Chief Complaint   Patient presents with    Seizures    Medication Refill       HISTORY OF PRESENT ILLNESS    Ira Downey is a 34 y.o. female with a PMH of grand mal seizures on Keppra and Lexapro who presents to the emergency department with a complaint of seizure-like activity this morning.  Patient has been having frequent seizures this month, with the last seizure last Sunday prior to today.  She was working on a computer desk when she suddenly experienced seizure-like activity which she described as generalized tremors with a postictal state.  Patient was at home with her spouse who was able to help her during this period.  She denies falling to the ground or hitting her head.  She reports activity lasted for about 10 to 15 minutes with full resolution of symptoms.  She has been compliant with her Lexapro and Keppra, but states pharmacy has been giving her a lower dose than what was prescribed by her neurologist, Dr. Winchester.  Patient believes she is supposed to be taking Keppra 1 g twice daily, however the pharmacy prescribed Keppra 1 g in the morning and 500 mg at night.  She subsequently changed the prescription of the pharmacy to 1 g in the morning and 1 g in the evening, but continues experiencing seizure-like activities.  Unfortunate, patient has been dismissed from her neurology clinic because of frequent missed appointments which patient attributes to difficulty with transportation.  Patient is here seeking for refill as she is almost out of her medications.  She has been compliant with Lexapro which she takes 5 mg daily.  She denies headache, visual

## 2025-03-18 NOTE — ED NOTES
Pt to ED due to seizure activity. States she does have seizure activity. Denies pain. Pt takes Levetiracetam twice a day but is only supposed to take it once a day.

## 2025-03-19 LAB — KEPPRA: 17 UG/ML

## 2025-04-24 ENCOUNTER — APPOINTMENT (OUTPATIENT)
Dept: GENERAL RADIOLOGY | Age: 35
End: 2025-04-24
Payer: COMMERCIAL

## 2025-04-24 ENCOUNTER — HOSPITAL ENCOUNTER (INPATIENT)
Age: 35
LOS: 2 days | Discharge: HOME | End: 2025-04-26
Attending: STUDENT IN AN ORGANIZED HEALTH CARE EDUCATION/TRAINING PROGRAM | Admitting: INTERNAL MEDICINE
Payer: COMMERCIAL

## 2025-04-24 ENCOUNTER — APPOINTMENT (OUTPATIENT)
Dept: CT IMAGING | Age: 35
End: 2025-04-24
Payer: COMMERCIAL

## 2025-04-24 DIAGNOSIS — Z86.69 HISTORY OF SEIZURE DISORDER: ICD-10-CM

## 2025-04-24 DIAGNOSIS — S02.5XXB OPEN BROKEN TOOTH DUE TO TRAUMA WITHOUT COMPLICATION, INITIAL ENCOUNTER: ICD-10-CM

## 2025-04-24 DIAGNOSIS — E83.42 HYPOMAGNESEMIA: ICD-10-CM

## 2025-04-24 DIAGNOSIS — G40.901 STATUS EPILEPTICUS (HCC): Primary | ICD-10-CM

## 2025-04-24 PROBLEM — R94.01 ABNORMAL EEG: Status: ACTIVE | Noted: 2025-04-24

## 2025-04-24 LAB
ALBUMIN SERPL BCG-MCNC: 3.5 G/DL (ref 3.4–4.9)
ALBUMIN SERPL BCG-MCNC: 4.5 G/DL (ref 3.4–4.9)
ALP SERPL-CCNC: 44 U/L (ref 38–126)
ALP SERPL-CCNC: 62 U/L (ref 38–126)
ALT SERPL W/O P-5'-P-CCNC: 16 U/L (ref 10–35)
ALT SERPL W/O P-5'-P-CCNC: 25 U/L (ref 10–35)
AMPHETAMINES UR QL SCN: NEGATIVE
ANION GAP SERPL CALC-SCNC: 15 MEQ/L (ref 8–16)
ANION GAP SERPL CALC-SCNC: 17 MEQ/L (ref 8–16)
ANION GAP SERPL CALC-SCNC: 25 MEQ/L (ref 8–16)
AST SERPL-CCNC: 23 U/L (ref 10–35)
AST SERPL-CCNC: 37 U/L (ref 10–35)
B-HCG SERPL QL: NEGATIVE
BACTERIA URNS QL MICRO: ABNORMAL /HPF
BARBITURATES UR QL SCN: NEGATIVE
BASE EXCESS BLDA CALC-SCNC: -8 MMOL/L (ref -2–3)
BASOPHILS ABSOLUTE: 0 THOU/MM3 (ref 0–0.1)
BASOPHILS NFR BLD AUTO: 0.4 %
BENZODIAZ UR QL SCN: POSITIVE
BILIRUB CONJ SERPL-MCNC: 0.4 MG/DL (ref 0–0.2)
BILIRUB SERPL-MCNC: 0.5 MG/DL (ref 0.3–1.2)
BILIRUB SERPL-MCNC: 0.8 MG/DL (ref 0.3–1.2)
BILIRUB UR QL STRIP.AUTO: NEGATIVE
BUN SERPL-MCNC: 6 MG/DL (ref 8–23)
BUN SERPL-MCNC: 6 MG/DL (ref 8–23)
BUN SERPL-MCNC: 7 MG/DL (ref 8–23)
BZE UR QL SCN: NEGATIVE
CA-I BLD ISE-SCNC: 1.21 MMOL/L (ref 1.12–1.32)
CALCIUM SERPL-MCNC: 7.8 MG/DL (ref 8.6–10)
CALCIUM SERPL-MCNC: 8.3 MG/DL (ref 8.6–10)
CALCIUM SERPL-MCNC: 9.7 MG/DL (ref 8.6–10)
CANNABINOIDS UR QL SCN: POSITIVE
CASTS #/AREA URNS LPF: ABNORMAL /LPF
CASTS 2: ABNORMAL /LPF
CHARACTER UR: ABNORMAL
CHLORIDE BLD-SCNC: 108 MEQ/L (ref 98–109)
CHLORIDE SERPL-SCNC: 102 MEQ/L (ref 98–111)
CHLORIDE SERPL-SCNC: 108 MEQ/L (ref 98–111)
CHLORIDE SERPL-SCNC: 110 MEQ/L (ref 98–111)
CK SERPL-CCNC: 540 U/L (ref 26–192)
CK SERPL-CCNC: 629 U/L (ref 26–192)
CO2 SERPL-SCNC: 15 MEQ/L (ref 22–29)
CO2 SERPL-SCNC: 16 MEQ/L (ref 22–29)
CO2 SERPL-SCNC: 17 MEQ/L (ref 22–29)
COLLECTED BY:: ABNORMAL
COLOR, UA: YELLOW
CREAT SERPL-MCNC: 0.7 MG/DL (ref 0.5–0.9)
CREAT SERPL-MCNC: 0.7 MG/DL (ref 0.5–0.9)
CREAT SERPL-MCNC: 1 MG/DL (ref 0.5–0.9)
CRYSTALS URNS MICRO: ABNORMAL
DEPRECATED RDW RBC AUTO: 42.8 FL (ref 35–45)
DEPRECATED RDW RBC AUTO: 43.3 FL (ref 35–45)
DEVICE: ABNORMAL
EKG ATRIAL RATE: 86 BPM
EKG P AXIS: 78 DEGREES
EKG P-R INTERVAL: 104 MS
EKG Q-T INTERVAL: 328 MS
EKG QRS DURATION: 94 MS
EKG QTC CALCULATION (BAZETT): 392 MS
EKG R AXIS: 51 DEGREES
EKG T AXIS: 64 DEGREES
EKG VENTRICULAR RATE: 86 BPM
EOSINOPHIL NFR BLD AUTO: 0 %
EOSINOPHILS ABSOLUTE: 0 THOU/MM3 (ref 0–0.4)
EPITHELIAL CELLS, UA: ABNORMAL /HPF
ERYTHROCYTE [DISTWIDTH] IN BLOOD BY AUTOMATED COUNT: 14.1 % (ref 11.5–14.5)
ERYTHROCYTE [DISTWIDTH] IN BLOOD BY AUTOMATED COUNT: 14.2 % (ref 11.5–14.5)
ETHANOL SERPL-MCNC: < 0.01 % (ref 0–0.08)
FENTANYL: NEGATIVE
GFR SERPL CREATININE-BSD FRML MDRD: 76 ML/MIN/1.73M2
GFR SERPL CREATININE-BSD FRML MDRD: > 90 ML/MIN/1.73M2
GFR SERPL CREATININE-BSD FRML MDRD: > 90 ML/MIN/1.73M2
GLUCOSE BLD STRIP.AUTO-MCNC: 71 MG/DL (ref 70–108)
GLUCOSE BLD STRIP.AUTO-MCNC: 79 MG/DL (ref 70–108)
GLUCOSE BLD STRIP.AUTO-MCNC: 85 MG/DL (ref 70–108)
GLUCOSE BLD-MCNC: 104 MG/DL (ref 70–108)
GLUCOSE SERPL-MCNC: 103 MG/DL (ref 74–109)
GLUCOSE SERPL-MCNC: 67 MG/DL (ref 74–109)
GLUCOSE SERPL-MCNC: 76 MG/DL (ref 74–109)
GLUCOSE UR QL STRIP.AUTO: NEGATIVE MG/DL
HCO3 BLDA-SCNC: 18 MMOL/L (ref 23–28)
HCT VFR BLD AUTO: 29.4 % (ref 37–47)
HCT VFR BLD AUTO: 37.7 % (ref 37–47)
HGB BLD-MCNC: 12.5 GM/DL (ref 12–16)
HGB BLD-MCNC: 9.8 GM/DL (ref 12–16)
HGB UR QL STRIP.AUTO: ABNORMAL
IMM GRANULOCYTES # BLD AUTO: 0.03 THOU/MM3 (ref 0–0.07)
IMM GRANULOCYTES NFR BLD AUTO: 0.3 %
KETONES UR QL STRIP.AUTO: 15
LACTATE BLD-SCNC: 10 MMOL/L (ref 0.5–1.9)
LACTATE SERPL-SCNC: 1 MMOL/L (ref 0.5–2)
LACTIC ACID, SEPSIS: 1.4 MMOL/L (ref 0.5–1.9)
LIPASE SERPL-CCNC: 19 U/L (ref 13–60)
LYMPHOCYTES ABSOLUTE: 1.3 THOU/MM3 (ref 1–4.8)
LYMPHOCYTES NFR BLD AUTO: 12.1 %
MAGNESIUM SERPL-MCNC: 1.5 MG/DL (ref 1.6–2.6)
MAGNESIUM SERPL-MCNC: 1.9 MG/DL (ref 1.6–2.6)
MCH RBC QN AUTO: 27.9 PG (ref 26–33)
MCH RBC QN AUTO: 28 PG (ref 26–33)
MCHC RBC AUTO-ENTMCNC: 33.2 GM/DL (ref 32.2–35.5)
MCHC RBC AUTO-ENTMCNC: 33.3 GM/DL (ref 32.2–35.5)
MCV RBC AUTO: 83.8 FL (ref 81–99)
MCV RBC AUTO: 84.3 FL (ref 81–99)
MISCELLANEOUS 2: ABNORMAL
MONOCYTES ABSOLUTE: 0.7 THOU/MM3 (ref 0.4–1.3)
MONOCYTES NFR BLD AUTO: 7 %
NEUTROPHILS ABSOLUTE: 8.4 THOU/MM3 (ref 1.8–7.7)
NEUTROPHILS NFR BLD AUTO: 80.2 %
NITRITE UR QL STRIP: NEGATIVE
NRBC BLD AUTO-RTO: 0 /100 WBC
OPIATES UR QL SCN: NEGATIVE
OSMOLALITY SERPL CALC.SUM OF ELEC: 279.3 MOSMOL/KG (ref 275–300)
OSMOLALITY SERPL CALC.SUM OF ELEC: 281 MOSMOL/KG (ref 275–300)
OXYCODONE: NEGATIVE
PCO2 TEMP ADJ BLDMV: 37 MMHG (ref 41–51)
PCP UR QL SCN: NEGATIVE
PH BLDMV: 7.29 [PH] (ref 7.31–7.41)
PH UR STRIP.AUTO: 5.5 [PH] (ref 5–9)
PLATELET # BLD AUTO: 198 THOU/MM3 (ref 130–400)
PLATELET # BLD AUTO: 293 THOU/MM3 (ref 130–400)
PMV BLD AUTO: 10.9 FL (ref 9.4–12.4)
PMV BLD AUTO: 11.2 FL (ref 9.4–12.4)
PO2 BLDMV: 45 MMHG (ref 25–40)
POC CREATININE WHOLE BLOOD: 1 MG/DL (ref 0.5–1.2)
POTASSIUM BLD-SCNC: 4.5 MEQ/L (ref 3.5–4.9)
POTASSIUM SERPL-SCNC: 3.2 MEQ/L (ref 3.5–5.2)
POTASSIUM SERPL-SCNC: 3.5 MEQ/L (ref 3.5–5.2)
POTASSIUM SERPL-SCNC: 4.6 MEQ/L (ref 3.5–5.2)
PROLACTIN SERPL-MCNC: 218 NG/ML
PROT SERPL-MCNC: 6.1 G/DL (ref 6.4–8.3)
PROT SERPL-MCNC: 8.1 G/DL (ref 6.4–8.3)
PROT UR STRIP.AUTO-MCNC: 100 MG/DL
RBC # BLD AUTO: 3.51 MILL/MM3 (ref 4.2–5.4)
RBC # BLD AUTO: 4.47 MILL/MM3 (ref 4.2–5.4)
RBC URINE: ABNORMAL /HPF
RENAL EPI CELLS #/AREA URNS HPF: ABNORMAL /[HPF]
SAO2 % BLDMV: 76 %
SCAN OF BLOOD SMEAR: NORMAL
SITE: ABNORMAL
SODIUM BLD-SCNC: 144 MEQ/L (ref 138–146)
SODIUM SERPL-SCNC: 141 MEQ/L (ref 135–145)
SODIUM SERPL-SCNC: 142 MEQ/L (ref 135–145)
SODIUM SERPL-SCNC: 142 MEQ/L (ref 135–145)
SP GR UR REFRACT.AUTO: 1.02 (ref 1–1.03)
T4 FREE SERPL-MCNC: 1.5 NG/DL (ref 0.92–1.68)
TSH SERPL DL<=0.05 MIU/L-ACNC: 1.87 UIU/ML (ref 0.27–4.2)
UROBILINOGEN, URINE: 1 EU/DL (ref 0–1)
VENTILATION MODE VENT: ABNORMAL
WBC # BLD AUTO: 10.2 THOU/MM3 (ref 4.8–10.8)
WBC # BLD AUTO: 10.5 THOU/MM3 (ref 4.8–10.8)
WBC #/AREA URNS HPF: ABNORMAL /HPF
WBC #/AREA URNS HPF: ABNORMAL /[HPF]
YEAST LIKE FUNGI URNS QL MICRO: ABNORMAL

## 2025-04-24 PROCEDURE — 84132 ASSAY OF SERUM POTASSIUM: CPT

## 2025-04-24 PROCEDURE — C9254 INJECTION, LACOSAMIDE: HCPCS

## 2025-04-24 PROCEDURE — 71045 X-RAY EXAM CHEST 1 VIEW: CPT

## 2025-04-24 PROCEDURE — 84295 ASSAY OF SERUM SODIUM: CPT

## 2025-04-24 PROCEDURE — 2580000003 HC RX 258

## 2025-04-24 PROCEDURE — 82435 ASSAY OF BLOOD CHLORIDE: CPT

## 2025-04-24 PROCEDURE — 82550 ASSAY OF CK (CPK): CPT

## 2025-04-24 PROCEDURE — 2000000000 HC ICU R&B

## 2025-04-24 PROCEDURE — 81001 URINALYSIS AUTO W/SCOPE: CPT

## 2025-04-24 PROCEDURE — 6360000002 HC RX W HCPCS

## 2025-04-24 PROCEDURE — 83605 ASSAY OF LACTIC ACID: CPT

## 2025-04-24 PROCEDURE — 85025 COMPLETE CBC W/AUTO DIFF WBC: CPT

## 2025-04-24 PROCEDURE — 82948 REAGENT STRIP/BLOOD GLUCOSE: CPT

## 2025-04-24 PROCEDURE — 95700 EEG CONT REC W/VID EEG TECH: CPT

## 2025-04-24 PROCEDURE — 6370000000 HC RX 637 (ALT 250 FOR IP): Performed by: INTERNAL MEDICINE

## 2025-04-24 PROCEDURE — 93005 ELECTROCARDIOGRAM TRACING: CPT

## 2025-04-24 PROCEDURE — 2500000003 HC RX 250 WO HCPCS: Performed by: INTERNAL MEDICINE

## 2025-04-24 PROCEDURE — 82565 ASSAY OF CREATININE: CPT

## 2025-04-24 PROCEDURE — 83735 ASSAY OF MAGNESIUM: CPT

## 2025-04-24 PROCEDURE — 82077 ASSAY SPEC XCP UR&BREATH IA: CPT

## 2025-04-24 PROCEDURE — 36415 COLL VENOUS BLD VENIPUNCTURE: CPT

## 2025-04-24 PROCEDURE — 95718 EEG PHYS/QHP 2-12 HR W/VEEG: CPT | Performed by: PSYCHIATRY & NEUROLOGY

## 2025-04-24 PROCEDURE — 6360000002 HC RX W HCPCS: Performed by: INTERNAL MEDICINE

## 2025-04-24 PROCEDURE — 82330 ASSAY OF CALCIUM: CPT

## 2025-04-24 PROCEDURE — 83690 ASSAY OF LIPASE: CPT

## 2025-04-24 PROCEDURE — 87086 URINE CULTURE/COLONY COUNT: CPT

## 2025-04-24 PROCEDURE — 2580000003 HC RX 258: Performed by: STUDENT IN AN ORGANIZED HEALTH CARE EDUCATION/TRAINING PROGRAM

## 2025-04-24 PROCEDURE — 99222 1ST HOSP IP/OBS MODERATE 55: CPT | Performed by: INTERNAL MEDICINE

## 2025-04-24 PROCEDURE — 99285 EMERGENCY DEPT VISIT HI MDM: CPT

## 2025-04-24 PROCEDURE — 82947 ASSAY GLUCOSE BLOOD QUANT: CPT

## 2025-04-24 PROCEDURE — 82803 BLOOD GASES ANY COMBINATION: CPT

## 2025-04-24 PROCEDURE — 80307 DRUG TEST PRSMV CHEM ANLYZR: CPT

## 2025-04-24 PROCEDURE — 2580000003 HC RX 258: Performed by: EMERGENCY MEDICINE

## 2025-04-24 PROCEDURE — 84439 ASSAY OF FREE THYROXINE: CPT

## 2025-04-24 PROCEDURE — 70450 CT HEAD/BRAIN W/O DYE: CPT

## 2025-04-24 PROCEDURE — 85027 COMPLETE CBC AUTOMATED: CPT

## 2025-04-24 PROCEDURE — 96375 TX/PRO/DX INJ NEW DRUG ADDON: CPT

## 2025-04-24 PROCEDURE — 6360000002 HC RX W HCPCS: Performed by: STUDENT IN AN ORGANIZED HEALTH CARE EDUCATION/TRAINING PROGRAM

## 2025-04-24 PROCEDURE — 96365 THER/PROPH/DIAG IV INF INIT: CPT

## 2025-04-24 PROCEDURE — 84443 ASSAY THYROID STIM HORMONE: CPT

## 2025-04-24 PROCEDURE — 93010 ELECTROCARDIOGRAM REPORT: CPT | Performed by: INTERNAL MEDICINE

## 2025-04-24 PROCEDURE — 2580000003 HC RX 258: Performed by: INTERNAL MEDICINE

## 2025-04-24 PROCEDURE — 99291 CRITICAL CARE FIRST HOUR: CPT | Performed by: INTERNAL MEDICINE

## 2025-04-24 PROCEDURE — 72125 CT NECK SPINE W/O DYE: CPT

## 2025-04-24 PROCEDURE — 82248 BILIRUBIN DIRECT: CPT

## 2025-04-24 PROCEDURE — 84146 ASSAY OF PROLACTIN: CPT

## 2025-04-24 PROCEDURE — 84703 CHORIONIC GONADOTROPIN ASSAY: CPT

## 2025-04-24 PROCEDURE — 80053 COMPREHEN METABOLIC PANEL: CPT

## 2025-04-24 RX ORDER — ONDANSETRON 2 MG/ML
4 INJECTION INTRAMUSCULAR; INTRAVENOUS ONCE
Status: COMPLETED | OUTPATIENT
Start: 2025-04-24 | End: 2025-04-24

## 2025-04-24 RX ORDER — LORAZEPAM 2 MG/ML
1 INJECTION INTRAMUSCULAR EVERY 6 HOURS PRN
Status: DISCONTINUED | OUTPATIENT
Start: 2025-04-24 | End: 2025-04-24 | Stop reason: ALTCHOICE

## 2025-04-24 RX ORDER — ESCITALOPRAM OXALATE 10 MG/1
5 TABLET ORAL DAILY
Status: DISCONTINUED | OUTPATIENT
Start: 2025-04-24 | End: 2025-04-24

## 2025-04-24 RX ORDER — LEVETIRACETAM 500 MG/5ML
1500 INJECTION, SOLUTION, CONCENTRATE INTRAVENOUS EVERY 12 HOURS
Status: DISCONTINUED | OUTPATIENT
Start: 2025-04-24 | End: 2025-04-26 | Stop reason: HOSPADM

## 2025-04-24 RX ORDER — SODIUM CHLORIDE 0.9 % (FLUSH) 0.9 %
10 SYRINGE (ML) INJECTION PRN
Status: DISCONTINUED | OUTPATIENT
Start: 2025-04-24 | End: 2025-04-26 | Stop reason: HOSPADM

## 2025-04-24 RX ORDER — POTASSIUM CHLORIDE 7.45 MG/ML
10 INJECTION INTRAVENOUS
Status: ACTIVE | OUTPATIENT
Start: 2025-04-24 | End: 2025-04-25

## 2025-04-24 RX ORDER — FENTANYL CITRATE-0.9 % NACL/PF 10 MCG/ML
25-200 PLASTIC BAG, INJECTION (ML) INTRAVENOUS CONTINUOUS
Refills: 0 | Status: DISCONTINUED | OUTPATIENT
Start: 2025-04-24 | End: 2025-04-24

## 2025-04-24 RX ORDER — ENOXAPARIN SODIUM 100 MG/ML
40 INJECTION SUBCUTANEOUS EVERY 24 HOURS
Status: DISCONTINUED | OUTPATIENT
Start: 2025-04-24 | End: 2025-04-26 | Stop reason: HOSPADM

## 2025-04-24 RX ORDER — PROPOFOL 10 MG/ML
5-50 INJECTION, EMULSION INTRAVENOUS CONTINUOUS
Status: DISCONTINUED | OUTPATIENT
Start: 2025-04-24 | End: 2025-04-24

## 2025-04-24 RX ORDER — MIDAZOLAM HYDROCHLORIDE 1 MG/ML
2 INJECTION, SOLUTION INTRAMUSCULAR; INTRAVENOUS ONCE
Status: DISCONTINUED | OUTPATIENT
Start: 2025-04-24 | End: 2025-04-24

## 2025-04-24 RX ORDER — ONDANSETRON 2 MG/ML
INJECTION INTRAMUSCULAR; INTRAVENOUS
Status: COMPLETED
Start: 2025-04-24 | End: 2025-04-24

## 2025-04-24 RX ORDER — PROPOFOL 10 MG/ML
5-50 INJECTION, EMULSION INTRAVENOUS CONTINUOUS
Status: DISCONTINUED | OUTPATIENT
Start: 2025-04-24 | End: 2025-04-26

## 2025-04-24 RX ORDER — SODIUM CHLORIDE 0.9 % (FLUSH) 0.9 %
5-40 SYRINGE (ML) INJECTION EVERY 12 HOURS SCHEDULED
Status: DISCONTINUED | OUTPATIENT
Start: 2025-04-24 | End: 2025-04-26 | Stop reason: HOSPADM

## 2025-04-24 RX ORDER — MIDAZOLAM HYDROCHLORIDE 1 MG/ML
5 INJECTION, SOLUTION INTRAMUSCULAR; INTRAVENOUS ONCE
Status: COMPLETED | OUTPATIENT
Start: 2025-04-24 | End: 2025-04-24

## 2025-04-24 RX ORDER — SODIUM CHLORIDE 9 MG/ML
INJECTION, SOLUTION INTRAVENOUS CONTINUOUS
Status: DISCONTINUED | OUTPATIENT
Start: 2025-04-24 | End: 2025-04-24

## 2025-04-24 RX ORDER — MAGNESIUM SULFATE IN WATER 40 MG/ML
2000 INJECTION, SOLUTION INTRAVENOUS ONCE
Status: COMPLETED | OUTPATIENT
Start: 2025-04-24 | End: 2025-04-24

## 2025-04-24 RX ORDER — ACETAMINOPHEN 650 MG/1
650 SUPPOSITORY RECTAL EVERY 6 HOURS PRN
Status: DISCONTINUED | OUTPATIENT
Start: 2025-04-24 | End: 2025-04-26 | Stop reason: HOSPADM

## 2025-04-24 RX ORDER — 0.9 % SODIUM CHLORIDE 0.9 %
1000 INTRAVENOUS SOLUTION INTRAVENOUS ONCE
Status: COMPLETED | OUTPATIENT
Start: 2025-04-24 | End: 2025-04-24

## 2025-04-24 RX ORDER — LACOSAMIDE 50 MG/1
100 TABLET ORAL EVERY 12 HOURS
Status: DISCONTINUED | OUTPATIENT
Start: 2025-04-25 | End: 2025-04-26 | Stop reason: HOSPADM

## 2025-04-24 RX ORDER — POTASSIUM CHLORIDE 1500 MG/1
40 TABLET, EXTENDED RELEASE ORAL PRN
Status: DISCONTINUED | OUTPATIENT
Start: 2025-04-24 | End: 2025-04-26 | Stop reason: HOSPADM

## 2025-04-24 RX ORDER — POLYETHYLENE GLYCOL 3350 17 G/17G
17 POWDER, FOR SOLUTION ORAL DAILY PRN
Status: DISCONTINUED | OUTPATIENT
Start: 2025-04-24 | End: 2025-04-26 | Stop reason: HOSPADM

## 2025-04-24 RX ORDER — FENTANYL CITRATE 50 UG/ML
100 INJECTION, SOLUTION INTRAMUSCULAR; INTRAVENOUS ONCE
Status: DISCONTINUED | OUTPATIENT
Start: 2025-04-24 | End: 2025-04-26 | Stop reason: HOSPADM

## 2025-04-24 RX ORDER — MAGNESIUM SULFATE IN WATER 40 MG/ML
2000 INJECTION, SOLUTION INTRAVENOUS PRN
Status: DISCONTINUED | OUTPATIENT
Start: 2025-04-24 | End: 2025-04-26 | Stop reason: HOSPADM

## 2025-04-24 RX ORDER — SODIUM CHLORIDE, SODIUM LACTATE, POTASSIUM CHLORIDE, CALCIUM CHLORIDE 600; 310; 30; 20 MG/100ML; MG/100ML; MG/100ML; MG/100ML
INJECTION, SOLUTION INTRAVENOUS CONTINUOUS
Status: DISCONTINUED | OUTPATIENT
Start: 2025-04-24 | End: 2025-04-26

## 2025-04-24 RX ORDER — SODIUM CHLORIDE 9 MG/ML
INJECTION, SOLUTION INTRAVENOUS PRN
Status: DISCONTINUED | OUTPATIENT
Start: 2025-04-24 | End: 2025-04-26 | Stop reason: HOSPADM

## 2025-04-24 RX ORDER — POTASSIUM CHLORIDE 7.45 MG/ML
10 INJECTION INTRAVENOUS PRN
Status: DISCONTINUED | OUTPATIENT
Start: 2025-04-24 | End: 2025-04-26 | Stop reason: HOSPADM

## 2025-04-24 RX ORDER — MIDAZOLAM HYDROCHLORIDE 1 MG/ML
4 INJECTION, SOLUTION INTRAMUSCULAR; INTRAVENOUS ONCE
Status: DISCONTINUED | OUTPATIENT
Start: 2025-04-24 | End: 2025-04-26 | Stop reason: HOSPADM

## 2025-04-24 RX ORDER — LACOSAMIDE 50 MG/1
100 TABLET ORAL 2 TIMES DAILY
Status: DISCONTINUED | OUTPATIENT
Start: 2025-04-24 | End: 2025-04-24

## 2025-04-24 RX ORDER — ESCITALOPRAM OXALATE 10 MG/1
10 TABLET ORAL DAILY
Status: DISCONTINUED | OUTPATIENT
Start: 2025-04-24 | End: 2025-04-26 | Stop reason: HOSPADM

## 2025-04-24 RX ORDER — ONDANSETRON 2 MG/ML
4 INJECTION INTRAMUSCULAR; INTRAVENOUS EVERY 6 HOURS PRN
Status: DISCONTINUED | OUTPATIENT
Start: 2025-04-24 | End: 2025-04-26 | Stop reason: HOSPADM

## 2025-04-24 RX ORDER — DIAZEPAM 10 MG/2ML
5 INJECTION, SOLUTION INTRAMUSCULAR; INTRAVENOUS EVERY 6 HOURS PRN
Status: DISCONTINUED | OUTPATIENT
Start: 2025-04-24 | End: 2025-04-26 | Stop reason: HOSPADM

## 2025-04-24 RX ORDER — ONDANSETRON 4 MG/1
4 TABLET, ORALLY DISINTEGRATING ORAL EVERY 8 HOURS PRN
Status: DISCONTINUED | OUTPATIENT
Start: 2025-04-24 | End: 2025-04-26 | Stop reason: HOSPADM

## 2025-04-24 RX ORDER — DEXTROSE MONOHYDRATE 100 MG/ML
INJECTION, SOLUTION INTRAVENOUS CONTINUOUS
Status: ACTIVE | OUTPATIENT
Start: 2025-04-24 | End: 2025-04-24

## 2025-04-24 RX ORDER — ACETAMINOPHEN 325 MG/1
650 TABLET ORAL EVERY 6 HOURS PRN
Status: DISCONTINUED | OUTPATIENT
Start: 2025-04-24 | End: 2025-04-26 | Stop reason: HOSPADM

## 2025-04-24 RX ORDER — LEVETIRACETAM 500 MG/1
1000 TABLET ORAL 2 TIMES DAILY
Status: DISCONTINUED | OUTPATIENT
Start: 2025-04-24 | End: 2025-04-26 | Stop reason: HOSPADM

## 2025-04-24 RX ADMIN — DIAZEPAM 5 MG: 5 INJECTION, SOLUTION INTRAMUSCULAR; INTRAVENOUS at 17:22

## 2025-04-24 RX ADMIN — ONDANSETRON 4 MG: 2 INJECTION INTRAMUSCULAR; INTRAVENOUS at 05:39

## 2025-04-24 RX ADMIN — LACOSAMIDE 200 MG: 10 INJECTION INTRAVENOUS at 17:43

## 2025-04-24 RX ADMIN — DEXTROSE: 10 SOLUTION INTRAVENOUS at 10:55

## 2025-04-24 RX ADMIN — SODIUM CHLORIDE, PRESERVATIVE FREE 10 ML: 5 INJECTION INTRAVENOUS at 20:06

## 2025-04-24 RX ADMIN — POTASSIUM CHLORIDE 40 MEQ: 1500 TABLET, EXTENDED RELEASE ORAL at 22:27

## 2025-04-24 RX ADMIN — ENOXAPARIN SODIUM 40 MG: 100 INJECTION SUBCUTANEOUS at 20:07

## 2025-04-24 RX ADMIN — MIDAZOLAM 5 MG: 1 INJECTION INTRAMUSCULAR; INTRAVENOUS at 05:35

## 2025-04-24 RX ADMIN — WATER 1000 MG: 1 INJECTION INTRAMUSCULAR; INTRAVENOUS; SUBCUTANEOUS at 15:35

## 2025-04-24 RX ADMIN — SODIUM CHLORIDE: 0.9 INJECTION, SOLUTION INTRAVENOUS at 14:22

## 2025-04-24 RX ADMIN — SODIUM CHLORIDE 1000 ML: 0.9 INJECTION, SOLUTION INTRAVENOUS at 08:50

## 2025-04-24 RX ADMIN — SODIUM CHLORIDE, SODIUM LACTATE, POTASSIUM CHLORIDE, AND CALCIUM CHLORIDE: .6; .31; .03; .02 INJECTION, SOLUTION INTRAVENOUS at 22:31

## 2025-04-24 RX ADMIN — LEVETIRACETAM 3810 MG: 100 INJECTION INTRAVENOUS at 05:51

## 2025-04-24 RX ADMIN — MAGNESIUM SULFATE HEPTAHYDRATE 2000 MG: 40 INJECTION, SOLUTION INTRAVENOUS at 06:46

## 2025-04-24 RX ADMIN — LEVETIRACETAM 1500 MG: 100 INJECTION INTRAVENOUS at 20:03

## 2025-04-24 ASSESSMENT — PAIN - FUNCTIONAL ASSESSMENT
PAIN_FUNCTIONAL_ASSESSMENT: NONE - DENIES PAIN
PAIN_FUNCTIONAL_ASSESSMENT: 0-10
PAIN_FUNCTIONAL_ASSESSMENT: 0-10

## 2025-04-24 ASSESSMENT — PAIN DESCRIPTION - LOCATION
LOCATION: HEAD
LOCATION: TEETH

## 2025-04-24 ASSESSMENT — PAIN SCALES - GENERAL
PAINLEVEL_OUTOF10: 10
PAINLEVEL_OUTOF10: 3
PAINLEVEL_OUTOF10: 3

## 2025-04-24 ASSESSMENT — LIFESTYLE VARIABLES
HOW MANY STANDARD DRINKS CONTAINING ALCOHOL DO YOU HAVE ON A TYPICAL DAY: 1 OR 2
HOW OFTEN DO YOU HAVE A DRINK CONTAINING ALCOHOL: MONTHLY OR LESS

## 2025-04-24 ASSESSMENT — ENCOUNTER SYMPTOMS: SHORTNESS OF BREATH: 0

## 2025-04-24 NOTE — PROCEDURES
LONG-TERM EEG-VIDEO MONITORING   CLINICAL NEUROPHYSIOLOGY LABORATORY  DEPARTMENT OF NEUROLOGY  [facility]    Patient: Ira Downey  Age: 34 y.o.  MRN: [id number]    Referring Physician: No ref. provider found  History: The patient is a 34 y.o. female who presented with seizures. This long-term video-EEG monitoring study was performed to evaluate for subclinical seizures. The patient is on neuroactive medications.   Ira Downey   Current Facility-Administered Medications   Medication Dose Route Frequency Provider Last Rate Last Admin    [Held by provider] levETIRAcetam (KEPPRA) tablet 1,000 mg  1,000 mg Oral BID Noah Antonio MD        sodium chloride flush 0.9 % injection 5-40 mL  5-40 mL IntraVENous 2 times per day Noah Antonio MD        sodium chloride flush 0.9 % injection 10 mL  10 mL IntraVENous PRN Noah Antonio MD        0.9 % sodium chloride infusion   IntraVENous PRN Noah Antonio MD        potassium chloride (KLOR-CON M) extended release tablet 40 mEq  40 mEq Oral PRN Noah Antonio MD        Or    potassium bicarb-citric acid (EFFER-K) effervescent tablet 40 mEq  40 mEq Oral PRN Noah Antonio MD        Or    potassium chloride 10 mEq/100 mL IVPB (Peripheral Line)  10 mEq IntraVENous PRN Noah Antonio MD        magnesium sulfate 2000 mg in 50 mL IVPB premix  2,000 mg IntraVENous PRN Noah Antonio MD        enoxaparin (LOVENOX) injection 40 mg  40 mg SubCUTAneous Q24H Noah Antonio MD        ondansetron (ZOFRAN-ODT) disintegrating tablet 4 mg  4 mg Oral Q8H PRN Noah Antonio MD        Or    ondansetron (ZOFRAN) injection 4 mg  4 mg IntraVENous Q6H PRN Noah Antonio MD        polyethylene glycol (GLYCOLAX) packet 17 g  17 g Oral Daily PRN Noah Antonio MD        acetaminophen (TYLENOL) tablet 650 mg  650 mg Oral Q6H PRN Noah Antonio MD        Or    acetaminophen (TYLENOL) suppository 650 mg  650 mg Rectal Q6H PRN Paco

## 2025-04-24 NOTE — ED TRIAGE NOTES
Patient to ED via [facility] due to seizures. Per [facility], patient significant other called EMS due to pt seizing 15-20 minutes prior to EMS arrival. Once EMS got there, tonic-clonic seizure activity noted for another 15 minutes while IV was established and 5mg of versed given IV at 0505. Once the versed was given, the movements stopped. Patient arrives to the ED, stiff and rigid; unable to respond to commands. Pt is diaphoretic and bleeding noted from mouth. EMS states boyfriend accidentally pulled pt bottom tooth out while attempting to clean saliva. Suction ready at bedside, seizure pads placed on bed rails. Dr. Oviedo and Dr. Amaya at bedside to assess.

## 2025-04-24 NOTE — PROGRESS NOTES
[facility]  Neurodiagnostic Laboratory Technician Report  [facility] ICU 4D  Stat, Long-Term Video Monitoring (LTVM)    Name: Ira Downey  : 1990  Age: 34 y.o.  Sex: female  MRN: [id number]  CSN: [id number]    Ordering Provider: Lavonne Ramon PA-C      EEG Number: 024-25 Day 1    Time In: Time In: 1318 (25)  Time Out:   (2025)  Total Treatment Time:      LTVM hooked up running bedside - Time: 1318    Clinical History: Witnessed 20 minutes of generalized tonic-clonic seizure movements plus an additional 15 minutes of seizure activity after EMS arrived.    2025 CT Head wo Cont  Impression:    Normal CT appearance of the brain.     2025  MRI Brain w/wo Cont  was ordered    Past Medical History:       Diagnosis Date    Anxiety     Depression     Hearing loss     Bilateral    Seizures (HCC)        Medications:   Prior to Admission medications    Medication Sig Start Date End Date Taking? Authorizing Provider   levETIRAcetam (KEPPRA) 1000 MG tablet Take 1 tablet by mouth 2 times daily 3/18/25   Dereck Velez DO   escitalopram (LEXAPRO) 5 MG tablet Take 1 tablet by mouth daily 3/18/25   Dereck Velez DO       Hand Dominance: unknown   Sedation: Yes   H.V. Completed: No,LTVM   Photic: No LTVM   Sleep: No LTVM   Drowsy: No LTVM   Sleep Deprived: No LTVM   Seizures Observed: Yes   Mentality: lethargic     Technician: Fiona Garza 2025

## 2025-04-24 NOTE — PLAN OF CARE
Have been notified by nursing staff patient has just had additional seizure lasting 2-3 minutes and in addition has had seizure about 2 hrs ago, undergoing eeg that will progress to continuous eeg, with status epilipticus this am and breaktrhough seizures I believe patient will require icu in event of likely recurrence of seizure for securement of airway

## 2025-04-24 NOTE — ED NOTES
Pt noted to be incontinent of urine. Straight cath completed, janet care provided. Sheets changed and external catheter placed. Pt given new blanket. Remains on monitor. Side rails up x2.

## 2025-04-24 NOTE — H&P
file prior to encounter.     Current Outpatient Medications on File Prior to Encounter   Medication Sig Dispense Refill    levETIRAcetam (KEPPRA) 1000 MG tablet Take 1 tablet by mouth 2 times daily 60 tablet 0    escitalopram (LEXAPRO) 5 MG tablet Take 1 tablet by mouth daily 30 tablet 0       Allergies:  Patient has no known allergies.    Social History:    reports that she has never smoked. She has never used smokeless tobacco. She reports current drug use. Drug: Marijuana (Weed). She reports that she does not drink alcohol.    Family History:       Problem Relation Age of Onset    No Known Problems Mother     Alcohol Abuse Father     Substance Abuse Father     No Known Problems Brother     Diabetes Maternal Grandmother        Review of systems:  Constitutional: no fever, no night sweats, no fatigue  Head: no headache, no head injury, no migranes.  Eye: no blurring of vision, no double vision.  Ears: no hearing difficulty, no tinnitus  Mouth/throat: no ulceration, dental caries, dysphagia  Lungs: no cough, no shortness of breath, no wheeze  CVS: no palpitation, no chest pain, no shortness of breath  GI: no abdominal pain, no nausea , no vomiting, no constipation  HUMPHREY: no dysuria, frequency and urgency, no hematuria, no kidney stones  Musculoskeletal: no joint pain, swelling , stiffness  Endocrine: no polyuria, polydypsia, no cold or heat intolerence  Hematology: no anemia, no easy brusing or bleeding, no hx of clotting disorder  Dermatology: no skin rash, no eczema, no prurities,  Psychiatry: no depression, no anxiety,no panic attacks, no suicide ideation  Neurology: no syncope, no seizures, no numbness or tingling of hands, no numbness or tingling of feet, no paresis    10 point review of systems completed, all other than noted above are negative.       Vitals:   Vitals:    04/24/25 1056   BP: 116/69   Pulse: 87   Resp: 16   Temp: 97.7 °F (36.5 °C)   SpO2: 100%      BMI: There is no height or weight on file to  approximately 2 years ago,In ER she received IV diazepam with resolution and she has had IV Keppra ordered I have requested neurology consult it appears EEG has been requested patient will require neurochecks and monitoring in neuro unit, patient did have urinary incontinence does not appear to have bitten her tongue this time, patient appears to have sensorineural loss since childhood and seizure disorder since childhood and appears to present here with hypomagnesemia with differential possibly EAST syndrome to be considered, chest x-ray not suggestive of aspiration pneumonitis  Bacteriuria I have reviewed her urinalysis it is not a good sample but I am concerned she may have had underlying low-grade fever and nonetheless I will treat the patient with IV Rocephin for at least 3 days this will also cover for any potential infection developing in her mandible from loss of tooth #24 possible elated to seizures  Metabolic acidosis possible related to prolonged seizure I would like the patient to receive IV isotonic fluids for at least 24 hours her renal function appears to be intact her ABG is suggestive of a metabolic acidosis with attempted compensation respiratory wise  Dehydration definitively noted with hyaline cast within the urine she continues IV isotonic fluids request speech evaluation when her mentation clears  Displaced tooth #24 the patient does states she has some pain here I will continue IV Rocephin for at least 3 days in the event she has evolving infection developing I do not want to lower her seizure threshold with fevers  Hypomagnesemia replaced in ER        DVT prophylaxis: [x] Lovenox                                 [] SCDs                                 [] SQ Heparin                                 [] Encourage ambulation, low risk for DVT, no chemical or mechanical prophylaxis necessary              [] Already on Anticoagulation                Anticipated Disposition upon discharge: [x] Home

## 2025-04-24 NOTE — CONSULTS
Neurology Consult Note    Date:4/24/2025       Room:[address] [place] [address]-  Patient Name:Ira Downey     YOB: 1990     Age:34 y.o.    Requesting Physician: Trino Rueda MD     Reason for Consult:  Evaluate for seizures      Chief Complaint:   Chief Complaint   Patient presents with    Seizures       Subjective     Ira Downey is a 34 y.o. female with a history of seizures, hearing loss, anxiety and depress  who presented to [facility] ED on 4/24/25 for seizures. Info obtained from chart review and patient's significant other. Patient apparently had ~ 20 minutes of generalized tonic-clonic seizure movements plus an additional 15 minutes of seizure activity after EMS arrival. She was given 5 mg IV versed per EMS. After arrival to [facility] ED she continued to be rigid. While in the ED she was given an additional 5 mg IV versed, 2 mg IV mag and loaded with 60 mg/kg of IV keppra. Patient noted to have oral bleeding, and it was noted that her boyfriend accidentally pulled out one of her teeth when trying to remove a rag from her mouth.  UDS + for benzo (versed given in ED and EMS) and cannabinoids. Lactic initially 10.0, improved to 1.4. She has a hx of seizures and follows with Dr. Pandey, last OV 8/2024, she is on Keppra 1g BID at home. Initial glucose 103, repeat 67.     At time of exam patient is alert, oriented to person, place, age, but not month or year. She endorses good compliance with her keppra. On exam her movements are slowed, but she is able to follow commands.     Patient transferred to  and while hooked up to long term EEG she was noted to have additional seizure activity  at 1356. After this episode she was again evaluated, at which time she was awake, alert and oriented and even asking about discharge. He significant other arrived and reported that patient has been having ~1 seizure / month for the last 6 months, but this has significantly increased his week. He notes that two days ago   Speech: Speech is delayed.         Behavior: Behavior is slowed.       Neurological Exam  Mental Status  Awake and alert. Oriented only to person, place and situation. Language is fluent with no aphasia.    Cranial Nerves  CN II: Right normal visual field. Left normal visual field.  CN III, IV, VI: Extraocular movements intact bilaterally. Pupils equal round and reactive to light bilaterally.  CN V:  Right: Facial sensation is normal.  Left: Facial sensation is normal on the left.  CN VII:  Right: There is no facial weakness.  Left: There is no facial weakness.  CN VIII:  Right: Hearing is decreased.  Left: Hearing is decreased.  CN IX, X: Palate elevates symmetrically  CN XII: Tongue midline without atrophy or fasciculations.    Motor    BUE: antigravity with mild drift  BLE: antigravity, mild drift. .    Sensory  Light touch is normal in upper and lower extremities.   Patient denies sensory deficits to light touch of upper and lower extremities. .    Coordination  Right: Finger-to-nose normal.Left: Finger-to-nose normal.       Labs/Imaging/Diagnostics   Labs:  CBC:  Recent Labs     04/24/25  0532   WBC 10.5   RBC 4.47   HGB 12.5   HCT 37.7   MCV 84.3        CHEMISTRIES:  Recent Labs     04/24/25  0532 04/24/25  0538 04/24/25  0958    144 142   K 4.6 4.5 3.5     --  110   CO2 15*  --  17*   BUN 6*  --  7*   CREATININE 1.0*  --  0.7   GLUCOSE 103  --  67*   MG 1.5*  --   --      COAGULATION STUDIES:No results for input(s): \"PROTIME\", \"INR\", \"APTT\" in the last 72 hours.  LIVER PROFILE:  Recent Labs     04/24/25  0532   AST 37*   ALT 25   BILIDIR 0.4*   BILITOT 0.8   ALKPHOS 62     CHOLESTEROL AND A1C:No results for input(s): \"HDL\", \"CHOL\", \"TRIG\", \"LABA1C\" in the last 720 hours.    Invalid input(s): \"LDLCALC\"   Imaging Last 24 Hours:  CT CERVICAL SPINE WO CONTRAST  Result Date: 4/24/2025  PROCEDURE: CT CERVICAL SPINE WO CONTRAST CLINICAL INFORMATION: Seizure. COMPARISON: No prior study. TECHNIQUE:

## 2025-04-24 NOTE — ED PROVIDER NOTES
[facility] EMERGENCY DEPARTMENT  EMERGENCY DEPARTMENT ENCOUNTER          Pt Name: Ira Downey  MRN: [id number]  Birthdate 1990  Date of evaluation: 4/24/2025  Physician: Neeraj Carr MD  Supervising Attending Physician: Arias Oviedo MD       CHIEF COMPLAINT       Chief Complaint   Patient presents with    Seizures         HISTORY OF PRESENT ILLNESS    HPI  Ira Downey is a 34 y.o. female with known history of seizures (noncompliant with Keppra) who presents to the emergency department from home for evaluation of tonic-clonic seizure-like activity.  EMS reports that call for seizure-like activity was around 0450.  Versed 5mg was given en-route.  Patient appeared to respond and stopped tonic-clonic seizure however was still unresponsive upon arrival.  No episodes of emesis.  No known history prior to seizure starting at this time.  EMS does note that boyfriend placed towel in patient's mouth as she was clenching and he did not want to bite her tongue.  He was trying to clear his saliva by shifting the towel and accidentally pulled one of her bottom incisors out.  Of note, patient appears to have last gotten Keppra with last ED visit.  Does not appear to have followed up outpatient.    The patient has no other acute complaints at this time.      PAST MEDICAL AND SURGICAL HISTORY     Past Medical History:   Diagnosis Date    Anxiety     Depression     Hearing loss     Bilateral    Seizures (HCC)      History reviewed. No pertinent surgical history.      MEDICATIONS     Current Facility-Administered Medications:     levETIRAcetam (KEPPRA) 3,810 mg in sodium chloride 0.9 % 250 mL IVPB, 60 mg/kg (Order-Specific), IntraVENous, Once, Arias Oviedo MD, Last Rate: 937.5 mL/hr at 04/24/25 0551, 3,810 mg at 04/24/25 0551    propofol infusion, 5-50 mcg/kg/min (Order-Specific), IntraVENous, Continuous, Arias Oviedo MD    fentaNYL (SUBLIMAZE) 1,000 mcg in sodium chloride 0.9%

## 2025-04-24 NOTE — ED NOTES
ED to inpatient nurses report      Chief Complaint:  Chief Complaint   Patient presents with    Seizures     Present to ED from: home     MOA:     LOC: alert and orientated to name and place  Mobility: Requires assistance * 1  Oxygen Baseline: room air     Current needs required: room air      Code Status:   No Order    What abnormal results were found and what did you give/do to treat them? Seizures   Any procedures or intervention occur? Dextrose,     Mental Status:  Level of Consciousness: Alert (0)    Psych Assessment:        Vitals:  Patient Vitals for the past 24 hrs:   BP Temp Temp src Pulse Resp SpO2 Weight   04/24/25 1215 116/71 -- -- 69 16 100 % 54.4 kg (120 lb)   04/24/25 1056 116/69 97.7 °F (36.5 °C) -- 87 16 100 % --   04/24/25 1030 121/75 -- -- 73 15 100 % --   04/24/25 0929 116/73 -- -- 81 19 100 % --   04/24/25 0855 115/69 -- -- 64 15 100 % --   04/24/25 0752 113/79 -- -- 75 17 100 % --   04/24/25 0704 106/68 -- -- 70 18 100 % --   04/24/25 0655 -- -- -- 73 20 100 % --   04/24/25 0626 115/71 -- -- 81 21 100 % --   04/24/25 0601 -- -- -- 90 22 100 % --   04/24/25 0600 -- -- -- 88 22 100 % --   04/24/25 0554 -- -- -- 84 21 100 % --   04/24/25 0553 117/80 98.3 °F (36.8 °C) Axillary 84 21 100 % --   04/24/25 0539 -- -- -- 80 23 100 % --   04/24/25 0537 -- -- -- 84 20 100 % --   04/24/25 0535 -- -- -- 84 27 98 % --   04/24/25 0530 (!) 162/104 -- -- -- -- -- --   04/24/25 0527 (!) 171/103 -- -- (!) 156 23 98 % --        LDAs:   Peripheral IV 04/24/25 Right Antecubital (Active)   Site Assessment Clean, dry & intact 04/24/25 0856   Line Status Normal saline locked 04/24/25 0856   Phlebitis Assessment No symptoms 04/24/25 0856   Infiltration Assessment 0 04/24/25 0856   Dressing Status Clean, dry & intact 04/24/25 0704       Peripheral IV 04/24/25 Left Wrist (Active)   Site Assessment Clean, dry & intact 04/24/25 0856   Line Status Infusing 04/24/25 0856   Line Care Connections checked and tightened 04/24/25  transferring out of bed, as appropriate    Swallow Screening        Preferred Language:   English      ALLERGIES     Patient has no known allergies.    SURGICAL HISTORY     History reviewed. No pertinent surgical history.    PAST MEDICAL HISTORY       Past Medical History:   Diagnosis Date    Anxiety     Depression     Hearing loss     Bilateral    Seizures (HCC)            Electronically signed by Pia Petit RN on 4/24/2025 at 12:38 PM

## 2025-04-24 NOTE — CONSULTS
CRITICAL CARE PROGRESS NOTE      Patient:  Ira Downey    Unit/Bed:4K-28/028-A  YOB: 1990  MRN: [id number]   PCP: No primary care provider on file.  Date of Admission: 4/24/2025  Chief Complaint:- Seizures    Assessment and Plan:    Breakthrough tonic-clonic  seizures: 2/2 to known seizure d/o since childhood. First seizure today witnessed by SO, reports lasting 15-20 min, additional 15 min seizure activity reported by EMS. Unprovoked. Symptoms - rhythmic jerking of upper and lower extremities. Preceding sx/aura - numbness of extremities. Duration of seizure - 15-20 min + 15 min w/ EMS, 2 min on floors. Aborted by - IV Valium. Triggers unknown. CT head negative in ED. Follows with neurology - yes, Dr. Pandey, last visit 8/2024; on Keppra 1mg BID at home. Received Keppra load.   MRI brain ordered  Continuous EEG  Continue Keppra 1500 BID & Vimpat  NPO until SLP eval for dysphagia   Keep K >4 and Mg >2. Monitor electrolytes with daily CMP; replacement protocols in place  Neurology consulted, appreciate recommendations  Fall & seizure precautions  Bacteremia: Pt denies any urinary sx. UA -  15 Ketones, large amount blood (3-5), proteinuria (100), trace amount LE, 8-15 hyaline casts, WBC (25-50), and many bacteria. Started on CTX.  Follow urine cx results  Will continue CTX for 3 days despite no clinical sx, as this could be potential trigger for seizures.  Hypomagnesemia: 1.5 OA, replaced. Likely 2/2 poor oral intake. Will continue to monitor and replace as needed  NAGMA: likely 2/2 seizures. On IVF w/ NS at 75 cc/hr, will continue. Continue to monitor.   Anxiety/depression:  Continue home Lexapro  B/l hearing loss:  Noted.        INITIAL H AND P AND ICU COURSE:  Ira Downey is a 34 y.o. female w/ PMHx seizure d/o, hearing loss, anxiety/depression who presented to [facility] for seizures. Per report, pt's SO called EMS for persistent seizures lasting 15-20 min. She had additional 15 min

## 2025-04-24 NOTE — ED NOTES
5mg versed given per Dr. Oviedo by BANDAR Hyde. Patient noted to be more relaxed and arms/legs more relaxed at this time. HR noted to be better.

## 2025-04-24 NOTE — ED NOTES
Pt. Resting in bed with even and unlabored respirations. Pt. Resting with eyes closed at this time. Pt. Has no further concerns, questions or needs at this time. Call light within reach.

## 2025-04-24 NOTE — ED NOTES
Pt transported to [facility] on cart in stable condition. Floor contacted before transport and spoke with Ira.

## 2025-04-24 NOTE — ED NOTES
Dr. Rosado at bedside assessing patient. Patient more alert at this time, responding to voice and tracking with eyes. Face cleaned up at this time.

## 2025-04-24 NOTE — ED NOTES
Pt is resting in bed. Pt is lethargic. Pt responds to voice. Pt is able to state she is at the hospital. Respirations are even and unlabored. VSS. Medicated per MAR order.

## 2025-04-24 NOTE — ED NOTES
Report received from Kaycee PORTILLO. Upon first contact, pt is resting in bed. Pt responsive to voice. Respirations even and unlabored on RA. VSS.

## 2025-04-24 NOTE — ED NOTES
Pt. Resting in bed with even and unlabored respirations. Pt. Resting with eyes closed. Family updated about plan of care. Pt. Has no further concerns, questions or needs at this time. Call light within reach.

## 2025-04-24 NOTE — ED NOTES
Pt. Resting in bed with even and unlabored respirations. Pt. States she has a headache. Pt remains drowsy and confused at this time.  Pt. Updated about plan of care and treatment. Pt. Has no further concerns, questions or needs at this time. Call light within reach.
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
## 2025-04-24 NOTE — ED NOTES
Pt. Resting in bed with even and unlabored respirations. Pt. Resting quietly in bed. EEG tech at bedside. Significant other updated about care. Pt. Updated about plan of care and treatment. Pt. Has no further concerns, questions or needs at this time. Call light within reach.

## 2025-04-24 NOTE — ED NOTES
RN noted elevated heart rate of 146. RN to room, pt arrears to have had a 20-30 sec seizure and is now post ictal

## 2025-04-25 ENCOUNTER — APPOINTMENT (OUTPATIENT)
Dept: MRI IMAGING | Age: 35
End: 2025-04-25
Payer: COMMERCIAL

## 2025-04-25 LAB
ALBUMIN SERPL BCG-MCNC: 3.1 G/DL (ref 3.4–4.9)
ALP SERPL-CCNC: 43 U/L (ref 38–126)
ALT SERPL W/O P-5'-P-CCNC: 12 U/L (ref 10–35)
ANION GAP SERPL CALC-SCNC: 15 MEQ/L (ref 8–16)
AST SERPL-CCNC: 29 U/L (ref 10–35)
BACTERIA UR CULT: ABNORMAL
BASOPHILS ABSOLUTE: 0.1 THOU/MM3 (ref 0–0.1)
BASOPHILS NFR BLD AUTO: 0.6 %
BILIRUB SERPL-MCNC: 0.5 MG/DL (ref 0.3–1.2)
BUN SERPL-MCNC: 5 MG/DL (ref 8–23)
CALCIUM SERPL-MCNC: 8.2 MG/DL (ref 8.6–10)
CHLORIDE SERPL-SCNC: 109 MEQ/L (ref 98–111)
CK SERPL-CCNC: 366 U/L (ref 26–192)
CO2 SERPL-SCNC: 15 MEQ/L (ref 22–29)
CREAT SERPL-MCNC: 0.7 MG/DL (ref 0.5–0.9)
DEPRECATED RDW RBC AUTO: 48 FL (ref 35–45)
EOSINOPHIL NFR BLD AUTO: 0.4 %
EOSINOPHILS ABSOLUTE: 0 THOU/MM3 (ref 0–0.4)
ERYTHROCYTE [DISTWIDTH] IN BLOOD BY AUTOMATED COUNT: 14.6 % (ref 11.5–14.5)
GFR SERPL CREATININE-BSD FRML MDRD: > 90 ML/MIN/1.73M2
GLUCOSE BLD STRIP.AUTO-MCNC: 128 MG/DL (ref 70–108)
GLUCOSE BLD STRIP.AUTO-MCNC: 74 MG/DL (ref 70–108)
GLUCOSE BLD STRIP.AUTO-MCNC: 74 MG/DL (ref 70–108)
GLUCOSE BLD STRIP.AUTO-MCNC: 90 MG/DL (ref 70–108)
GLUCOSE BLD STRIP.AUTO-MCNC: 99 MG/DL (ref 70–108)
GLUCOSE SERPL-MCNC: 57 MG/DL (ref 74–109)
HCT VFR BLD AUTO: 34.9 % (ref 37–47)
HGB BLD-MCNC: 10.9 GM/DL (ref 12–16)
IMM GRANULOCYTES # BLD AUTO: 0.03 THOU/MM3 (ref 0–0.07)
IMM GRANULOCYTES NFR BLD AUTO: 0.4 %
LACTATE SERPL-SCNC: 0.9 MMOL/L (ref 0.5–2)
LYMPHOCYTES ABSOLUTE: 1.9 THOU/MM3 (ref 1–4.8)
LYMPHOCYTES NFR BLD AUTO: 22.4 %
MAGNESIUM SERPL-MCNC: 1.8 MG/DL (ref 1.6–2.6)
MAGNESIUM SERPL-MCNC: 1.9 MG/DL (ref 1.6–2.6)
MCH RBC QN AUTO: 28.5 PG (ref 26–33)
MCHC RBC AUTO-ENTMCNC: 31.2 GM/DL (ref 32.2–35.5)
MCV RBC AUTO: 91.4 FL (ref 81–99)
MONOCYTES ABSOLUTE: 0.9 THOU/MM3 (ref 0.4–1.3)
MONOCYTES NFR BLD AUTO: 10.3 %
NEUTROPHILS ABSOLUTE: 5.5 THOU/MM3 (ref 1.8–7.7)
NEUTROPHILS NFR BLD AUTO: 65.9 %
NRBC BLD AUTO-RTO: 0 /100 WBC
ORGANISM: ABNORMAL
PLATELET # BLD AUTO: 179 THOU/MM3 (ref 130–400)
PMV BLD AUTO: 11 FL (ref 9.4–12.4)
POTASSIUM SERPL-SCNC: 3.8 MEQ/L (ref 3.5–5.2)
PROT SERPL-MCNC: 6 G/DL (ref 6.4–8.3)
RBC # BLD AUTO: 3.82 MILL/MM3 (ref 4.2–5.4)
SODIUM SERPL-SCNC: 139 MEQ/L (ref 135–145)
WBC # BLD AUTO: 8.4 THOU/MM3 (ref 4.8–10.8)

## 2025-04-25 PROCEDURE — 92610 EVALUATE SWALLOWING FUNCTION: CPT

## 2025-04-25 PROCEDURE — 6360000002 HC RX W HCPCS: Performed by: INTERNAL MEDICINE

## 2025-04-25 PROCEDURE — 82948 REAGENT STRIP/BLOOD GLUCOSE: CPT

## 2025-04-25 PROCEDURE — 6360000002 HC RX W HCPCS

## 2025-04-25 PROCEDURE — 70553 MRI BRAIN STEM W/O & W/DYE: CPT

## 2025-04-25 PROCEDURE — 36415 COLL VENOUS BLD VENIPUNCTURE: CPT

## 2025-04-25 PROCEDURE — A9579 GAD-BASE MR CONTRAST NOS,1ML: HCPCS

## 2025-04-25 PROCEDURE — 82550 ASSAY OF CK (CPK): CPT

## 2025-04-25 PROCEDURE — 83735 ASSAY OF MAGNESIUM: CPT

## 2025-04-25 PROCEDURE — 6360000004 HC RX CONTRAST MEDICATION

## 2025-04-25 PROCEDURE — 95720 EEG PHY/QHP EA INCR W/VEEG: CPT | Performed by: PSYCHIATRY & NEUROLOGY

## 2025-04-25 PROCEDURE — 2500000003 HC RX 250 WO HCPCS: Performed by: INTERNAL MEDICINE

## 2025-04-25 PROCEDURE — 2580000003 HC RX 258

## 2025-04-25 PROCEDURE — 92523 SPEECH SOUND LANG COMPREHEN: CPT

## 2025-04-25 PROCEDURE — 6370000000 HC RX 637 (ALT 250 FOR IP): Performed by: INTERNAL MEDICINE

## 2025-04-25 PROCEDURE — 87040 BLOOD CULTURE FOR BACTERIA: CPT

## 2025-04-25 PROCEDURE — C9254 INJECTION, LACOSAMIDE: HCPCS

## 2025-04-25 PROCEDURE — 85025 COMPLETE CBC W/AUTO DIFF WBC: CPT

## 2025-04-25 PROCEDURE — 2000000000 HC ICU R&B

## 2025-04-25 PROCEDURE — 99291 CRITICAL CARE FIRST HOUR: CPT | Performed by: INTERNAL MEDICINE

## 2025-04-25 PROCEDURE — 80053 COMPREHEN METABOLIC PANEL: CPT

## 2025-04-25 RX ADMIN — WATER 1000 MG: 1 INJECTION INTRAMUSCULAR; INTRAVENOUS; SUBCUTANEOUS at 15:30

## 2025-04-25 RX ADMIN — LACOSAMIDE 100 MG: 10 INJECTION INTRAVENOUS at 17:14

## 2025-04-25 RX ADMIN — GADOTERIDOL 10 ML: 279.3 INJECTION, SOLUTION INTRAVENOUS at 11:49

## 2025-04-25 RX ADMIN — ENOXAPARIN SODIUM 40 MG: 100 INJECTION SUBCUTANEOUS at 22:24

## 2025-04-25 RX ADMIN — SODIUM CHLORIDE, SODIUM LACTATE, POTASSIUM CHLORIDE, AND CALCIUM CHLORIDE: .6; .31; .03; .02 INJECTION, SOLUTION INTRAVENOUS at 22:24

## 2025-04-25 RX ADMIN — LACOSAMIDE 100 MG: 10 INJECTION INTRAVENOUS at 05:08

## 2025-04-25 RX ADMIN — LEVETIRACETAM 1500 MG: 100 INJECTION INTRAVENOUS at 05:07

## 2025-04-25 RX ADMIN — SODIUM CHLORIDE, PRESERVATIVE FREE 10 ML: 5 INJECTION INTRAVENOUS at 10:15

## 2025-04-25 RX ADMIN — ESCITALOPRAM OXALATE 10 MG: 10 TABLET ORAL at 10:16

## 2025-04-25 RX ADMIN — LEVETIRACETAM 1500 MG: 100 INJECTION INTRAVENOUS at 17:32

## 2025-04-25 RX ADMIN — SODIUM CHLORIDE, SODIUM LACTATE, POTASSIUM CHLORIDE, AND CALCIUM CHLORIDE: .6; .31; .03; .02 INJECTION, SOLUTION INTRAVENOUS at 12:14

## 2025-04-25 RX ADMIN — SODIUM CHLORIDE, PRESERVATIVE FREE 10 ML: 5 INJECTION INTRAVENOUS at 22:24

## 2025-04-25 ASSESSMENT — VISUAL ACUITY: VA_NORMAL: 1

## 2025-04-25 NOTE — PROCEDURES
LONG-TERM EEG-VIDEO MONITORING   CLINICAL NEUROPHYSIOLOGY LABORATORY  DEPARTMENT OF NEUROLOGY  [facility]    Patient: Ira Downey  Age: 34 y.o.  MRN: [id number]    Referring Physician: No ref. provider found  History: The patient is a 34 y.o. female who presented with seizures. This long-term video-EEG monitoring study was performed to evaluate for subclinical seizures. The patient is on neuroactive medications.   Ira Downey   Current Facility-Administered Medications   Medication Dose Route Frequency Provider Last Rate Last Admin    [Held by provider] levETIRAcetam (KEPPRA) tablet 1,000 mg  1,000 mg Oral BID Noah Antonio MD        sodium chloride flush 0.9 % injection 5-40 mL  5-40 mL IntraVENous 2 times per day Noah Antonio MD   10 mL at 04/24/25 2006    sodium chloride flush 0.9 % injection 10 mL  10 mL IntraVENous PRN Noah Antonio MD        0.9 % sodium chloride infusion   IntraVENous PRN Noah Antonio MD        potassium chloride (KLOR-CON M) extended release tablet 40 mEq  40 mEq Oral PRN Noah Antonio MD   40 mEq at 04/24/25 2227    Or    potassium bicarb-citric acid (EFFER-K) effervescent tablet 40 mEq  40 mEq Oral PRN Noah Antonio MD        Or    potassium chloride 10 mEq/100 mL IVPB (Peripheral Line)  10 mEq IntraVENous PRN Noah Antonio MD        magnesium sulfate 2000 mg in 50 mL IVPB premix  2,000 mg IntraVENous PRN Noah Antonio MD        enoxaparin (LOVENOX) injection 40 mg  40 mg SubCUTAneous Q24H Noah Antonio MD   40 mg at 04/24/25 2007    ondansetron (ZOFRAN-ODT) disintegrating tablet 4 mg  4 mg Oral Q8H PRN Noah Antonio MD        Or    ondansetron (ZOFRAN) injection 4 mg  4 mg IntraVENous Q6H PRN Noah Antonio MD        polyethylene glycol (GLYCOLAX) packet 17 g  17 g Oral Daily PRN Noah Antonio MD        acetaminophen (TYLENOL) tablet 650 mg  650 mg Oral Q6H PRN Noah Antonio MD        Or    acetaminophen  The final report will have a summary of behavior and electrographic findings with clinical correlation.    Nara Latham DO  Neurology/Epilepsy     Day 2 - 4/25/25, interrupted between 09:26-13:45    Interictal EEG Samples:  The background was continuous, relatively organized with an AP gradient, and composed of a normal frequency admixture. There was a symmetric, fairly well modulated 8.5-9 Hz PDR. During drowsiness there were bursts of diffuse slowing and waxing and waning of the posterior dominant rhythm. During stage II sleep symmetric V  waves, K complexes, and sleep spindles were seen.     There was occasional right frontotemporal polymorphic delta slowing.    There were occasional-frequent independent runs of right temporal rhythmic delta activity (RDA) without further evolution or associated clinical symptoms.    There were occasional low amplitude right temporal sharp waves with a T4/T6 maximum and a broad field.     Ictal EEG Recording / Patient Events: During this period the patient had no events or seizures.    Summary: The EEG was abnormal due to:  -Occasional right frontotemporal slowing  -Occasional-frequent right temporal RDA  -Occasional right temporal sharp waves    The right frontotemporal slowing indicated underlying focal neuronal dysfunction.  The right temporal RDA and sharp waves indicated an increased risk of focal onset seizures. No seizures were recorded.    Findings discussed with inpatient neurology team.    Monitoring was continued to evaluate for subclinical seizures. The EKG channel revealed no abnormalities.    Please note this is a preliminary report and updated daily. The final report will have a summary of behavior and electrographic findings with clinical correlation.    Nara Latham DO  Neurology/Epilepsy

## 2025-04-25 NOTE — FLOWSHEET NOTE
6567  Pts mother Zoila Downey called in and update given to her per permission of Ira. PTs mother states she has papers showing she is the durable power of  for medical. Fax number given to mother to fax papers. Informed her that would pass message on that papers will be coming. Voiced understanding.    3002  Informed Ira that her mother was faxing papers showing that she has POA for medical. Informed Ira that I was clarifying if this is accurate. Pt states that she does not want her mother as POA. Ira states she wants Donny who is her significant other for past 8 years. Informed her that she would need to make out new papers if that is the case and that our spiritual care dept can help with this. Pt did become very upset when discussing her mother. States she doesn't even want her mother to come here from [place]. Told her to discuss with Donny and let us know if she wants to proceed with new durable power paperwork. Voiced understanding. Reassured her that she is the one to make decision for herself and to name her person for DPA.

## 2025-04-25 NOTE — PROGRESS NOTES
[facility]  SPEECH THERAPY  [facility] ICU 4D  Speech - Language - Cognitive Evaluation + Clinical Swallow Evaluation    Discharge Recommendations: Outpatient Speech Therapy  DIET ORDER RECOMMENDATIONS AFTER EVALUATION: Regular diet and thin liquids  STRATEGIES: Full Upright Position and Small Bite/Sip     SLP Individual Minutes  Time In: 1401  Time Out: 1425  Minutes: 24  Timed Code Treatment Minutes: 0 Minutes     Speech, Language, Cognitive Evaluation: 16  Clinical Swallow Evaluation: 8    Date: 2025  Patient Name: Ira Downey      CSN: [id number]   : 1990  (34 y.o.)  Gender: female   Referring Physician:    Noah Antonio MD   Diagnosis: Status epilepticus (HCC)  Precautions: fall risk, aspiration risk  History of Present Illness/Injury: Ira Downey is a 34 y.o. female w/ PMHx seizure d/o, hearing loss, anxiety/depression who presented to [facility] for seizures. Per report, pt's SO called EMS for persistent seizures lasting 15-20 min. She had additional 15 min tonic-clonic activity after EMS arrived and was given IV Versed 5mg, after which the movements stopped. When she arrived to ED, she was given additional 5mg IV Versed, IV Magnesium, and loaded w/ Keppra. She had some persistent postictal confusion and seemed as if a bottom row tooth was injured/fell out. In ED she was somnolent but c/o tooth pain. On chart review, last seizure 2025. She was admitted to the floors with neurology consulted. She was placed on continuous EEG which captured an additional 2 min seizure (R temporal onset electroclinical seizure), and given IV Valium 5mg. Transfer to ICU initiated for c/f airway protection if she continues to experience breakthrough seizures.   Per chart review, she has been having ~1 seizure/month for the past 6 months, and the frequency has increased over this week. SO had reported that the seizure this AM was different than prior seizures because it lasted much longer  scenarios.  Goal 4: Patient will complete structured attention tasks with no more than 3 errors until task completion to permit potential return to multi-tasking, IADLs, driving, and occupational hobbies.    LONG TERM GOALS:  No LTGs established d/t claudia Mahoney MA, CCC-SLP [id number]

## 2025-04-25 NOTE — PROGRESS NOTES
Spiritual Health History and Assessment/Progress Note  [facility]    Attempted Encounter,  ,  ,      Name: Ira Downey MRN: [id number]    Age: 34 y.o.     Sex: female   Language: English   Sabianist: None   Status epilepticus (HCC)     Date: 4/25/2025            Total Time Calculated: (P) 5 min              Spiritual Assessment continued in [facility] ICU 4D        Referral/Consult From: Rounding   Encounter Overview/Reason: Attempted Encounter  Service Provided For: Patient    Tammy, Belief, Meaning:   Patient unable to assess at this time  Family/Friends No family/friends present      Importance and Influence:  Patient unable to assess at this time  Family/Friends No family/friends present    Community:  Patient feels well-supported. Support system includes: Parent/s  Family/Friends feel well-supported. Support system includes: Parent/s    Assessment and Plan of Care:   In my encounter with the 34 yr old patient, I attempted to see the patient in ICU, but the patient was unresponsive at this time. No family was present in the room.  I offered a prayer at the pt's side. A  will attempt to see the patient at a later time as a follow up. The pt was admitted due to status epilepticus.     Patient Interventions include: Other: Prayer  Family/Friends Interventions include: No family/friends present    Patient Plan of Care: Spiritual Care available upon further referral  Family/Friends Plan of Care: Spiritual Care available upon further referral    Electronically signed by KRISTOFER Grimes on 4/25/2025 at 9:59 AM

## 2025-04-25 NOTE — FLOWSHEET NOTE
1100  Transported to MRI per bed. Monitored and care partner Perry with pt.     1155  Returned to room. Denies any pain. Connected back to main monitor.

## 2025-04-25 NOTE — PLAN OF CARE
Problem: Discharge Planning  Goal: Discharge to home or other facility with appropriate resources  Outcome: Progressing  Discharge to home or other facility with appropriate resources:   Identify barriers to discharge with patient and caregiver   Arrange for needed discharge resources and transportation as appropriate   Identify discharge learning needs (meds, wound care, etc)   Arrange for interpreters to assist at discharge as needed   Refer to discharge planning if patient needs post-hospital services based on physician order or complex needs related to functional status, cognitive ability or social support system     Problem: Pain  Goal: Verbalizes/displays adequate comfort level or baseline comfort level  Outcome: Progressing     Problem: Skin/Tissue Integrity  Goal: Skin integrity remains intact  Description: 1.  Monitor for areas of redness and/or skin breakdown2.  Assess vascular access sites hourly3.  Every 4-6 hours minimum:  Change oxygen saturation probe site4.  Every 4-6 hours:  If on nasal continuous positive airway pressure, respiratory therapy assess nares and determine need for appliance change or resting period  Outcome: Progressing  Skin Integrity Remains Intact:   Monitor for areas of redness and/or skin breakdown   Every 4-6 hours minimum:  Change oxygen saturation probe site   Assess vascular access sites hourly   Every 4-6 hours:  If on nasal continuous positive airway pressure, assess nares and determine need for appliance change or resting period   Assess need for specialty bed   Turn and reposition as indicated     Problem: Safety - Adult  Goal: Free from fall injury  Outcome: Progressing
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
## 2025-04-25 NOTE — PROGRESS NOTES
CRITICAL CARE PROGRESS NOTE      Patient:  Ira Downey    Unit/Bed:4D-07/007-A  YOB: 1990  MRN: [id number]   PCP: No primary care provider on file.  Date of Admission: 4/24/2025  Chief Complaint:- seizures    Assessment and Plan:    Breakthrough tonic-clonic  seizures: 2/2 to known seizure d/o since childhood, breakthroughs unclear etiology, possibly hypoglycemia? First seizure today witnessed by SO, reports lasting 15-20 min, additional 15 min seizure activity reported by EMS. Unprovoked. Symptoms - rhythmic jerking of upper and lower extremities. Preceding sx/aura - numbness of extremities. Duration of seizure - 15-20 min + 15 min w/ EMS, 2 min on floors. Aborted by - IV Valium. Triggers unknown. CT head negative in ED. Follows with neurology - yes, Dr. Pandey, last visit 8/2024; on Keppra 1mg BID at home. Received Keppra load.   MRI brain ordered  Ongoing EEG  Continue Keppra 1500 BID & Vimpat  NPO until SLP eval for dysphagia   Keep K >4 and Mg >2. Monitor electrolytes with daily CMP; replacement protocols in place  Neurology consulted, appreciate recommendations  Fall & seizure precautions  Hypoglycemia: blood glucose has been between 57 - 104. Pt reports she eats 2 meals a day and snacks inbetween, uncertain validity. C/f not eating enough and having frequent hypoglycemic episodes causing breakthrough seizures. Hypoglycemia protocol in place. Encourage adequate food intake.   Bacteremia: Pt denies any urinary sx. UA -  15 Ketones, large amount blood (3-5), proteinuria (100), trace amount LE, 8-15 hyaline casts, WBC (25-50), and many bacteria. Started on CTX.  Follow urine cx results  Will continue CTX for 3 days despite no clinical sx, as this could be potential trigger for seizures.  Acute on Chronic Normocytic Anemia: 2/2 dilutional from IVF. Baseline Hgb ~11.5, Hgb OA 12.5 dropped to 10.9. No s/sx overt bleeding. No prior iron studies. Can consider if hgb remains low. Continue to

## 2025-04-26 VITALS
BODY MASS INDEX: 18.79 KG/M2 | OXYGEN SATURATION: 99 % | RESPIRATION RATE: 13 BRPM | SYSTOLIC BLOOD PRESSURE: 128 MMHG | WEIGHT: 120 LBS | TEMPERATURE: 99.6 F | HEART RATE: 62 BPM | DIASTOLIC BLOOD PRESSURE: 78 MMHG

## 2025-04-26 LAB
ALBUMIN SERPL BCG-MCNC: 3.2 G/DL (ref 3.4–4.9)
ALP SERPL-CCNC: 43 U/L (ref 38–126)
ALT SERPL W/O P-5'-P-CCNC: 14 U/L (ref 10–35)
ANION GAP SERPL CALC-SCNC: 11 MEQ/L (ref 8–16)
AST SERPL-CCNC: 17 U/L (ref 10–35)
BASOPHILS ABSOLUTE: 0 THOU/MM3 (ref 0–0.1)
BASOPHILS NFR BLD AUTO: 0.4 %
BILIRUB SERPL-MCNC: 0.5 MG/DL (ref 0.3–1.2)
BUN SERPL-MCNC: < 2 MG/DL (ref 8–23)
CALCIUM SERPL-MCNC: 8.2 MG/DL (ref 8.6–10)
CHLORIDE SERPL-SCNC: 109 MEQ/L (ref 98–111)
CO2 SERPL-SCNC: 22 MEQ/L (ref 22–29)
CREAT SERPL-MCNC: 0.7 MG/DL (ref 0.5–0.9)
DEPRECATED RDW RBC AUTO: 42.8 FL (ref 35–45)
EOSINOPHIL NFR BLD AUTO: 1.6 %
EOSINOPHILS ABSOLUTE: 0.1 THOU/MM3 (ref 0–0.4)
ERYTHROCYTE [DISTWIDTH] IN BLOOD BY AUTOMATED COUNT: 14.4 % (ref 11.5–14.5)
GFR SERPL CREATININE-BSD FRML MDRD: > 90 ML/MIN/1.73M2
GLUCOSE SERPL-MCNC: 83 MG/DL (ref 74–109)
HCT VFR BLD AUTO: 29.5 % (ref 37–47)
HGB BLD-MCNC: 9.9 GM/DL (ref 12–16)
IMM GRANULOCYTES # BLD AUTO: 0.02 THOU/MM3 (ref 0–0.07)
IMM GRANULOCYTES NFR BLD AUTO: 0.4 %
LYMPHOCYTES ABSOLUTE: 1.4 THOU/MM3 (ref 1–4.8)
LYMPHOCYTES NFR BLD AUTO: 27.9 %
MAGNESIUM SERPL-MCNC: 1.5 MG/DL (ref 1.6–2.6)
MCH RBC QN AUTO: 28 PG (ref 26–33)
MCHC RBC AUTO-ENTMCNC: 33.6 GM/DL (ref 32.2–35.5)
MCV RBC AUTO: 83.6 FL (ref 81–99)
MONOCYTES ABSOLUTE: 0.6 THOU/MM3 (ref 0.4–1.3)
MONOCYTES NFR BLD AUTO: 11.9 %
NEUTROPHILS ABSOLUTE: 2.9 THOU/MM3 (ref 1.8–7.7)
NEUTROPHILS NFR BLD AUTO: 57.8 %
NRBC BLD AUTO-RTO: 0 /100 WBC
PLATELET # BLD AUTO: 192 THOU/MM3 (ref 130–400)
PMV BLD AUTO: 10.9 FL (ref 9.4–12.4)
POTASSIUM SERPL-SCNC: 3.4 MEQ/L (ref 3.5–5.2)
PROT SERPL-MCNC: 5.7 G/DL (ref 6.4–8.3)
RBC # BLD AUTO: 3.53 MILL/MM3 (ref 4.2–5.4)
SODIUM SERPL-SCNC: 142 MEQ/L (ref 135–145)
WBC # BLD AUTO: 5.1 THOU/MM3 (ref 4.8–10.8)

## 2025-04-26 PROCEDURE — 99239 HOSP IP/OBS DSCHRG MGMT >30: CPT | Performed by: INTERNAL MEDICINE

## 2025-04-26 PROCEDURE — 85025 COMPLETE CBC W/AUTO DIFF WBC: CPT

## 2025-04-26 PROCEDURE — 95715 VEEG EA 12-26HR INTMT MNTR: CPT

## 2025-04-26 PROCEDURE — 83735 ASSAY OF MAGNESIUM: CPT

## 2025-04-26 PROCEDURE — 95718 EEG PHYS/QHP 2-12 HR W/VEEG: CPT | Performed by: PSYCHIATRY & NEUROLOGY

## 2025-04-26 PROCEDURE — 36415 COLL VENOUS BLD VENIPUNCTURE: CPT

## 2025-04-26 PROCEDURE — 6360000002 HC RX W HCPCS: Performed by: INTERNAL MEDICINE

## 2025-04-26 PROCEDURE — 6370000000 HC RX 637 (ALT 250 FOR IP)

## 2025-04-26 PROCEDURE — 6370000000 HC RX 637 (ALT 250 FOR IP): Performed by: INTERNAL MEDICINE

## 2025-04-26 PROCEDURE — 80053 COMPREHEN METABOLIC PANEL: CPT

## 2025-04-26 RX ORDER — LACOSAMIDE 100 MG/1
100 TABLET ORAL EVERY 12 HOURS
Qty: 60 TABLET | Refills: 0 | Status: SHIPPED | OUTPATIENT
Start: 2025-04-26 | End: 2025-05-26

## 2025-04-26 RX ORDER — LEVETIRACETAM 1000 MG/1
1500 TABLET ORAL 2 TIMES DAILY
Qty: 60 TABLET | Refills: 0 | Status: SHIPPED | OUTPATIENT
Start: 2025-04-26 | End: 2025-04-26

## 2025-04-26 RX ORDER — LEVETIRACETAM 1000 MG/1
1500 TABLET ORAL 2 TIMES DAILY
Qty: 60 TABLET | Refills: 0 | Status: SHIPPED | OUTPATIENT
Start: 2025-04-26

## 2025-04-26 RX ORDER — LACOSAMIDE 100 MG/1
100 TABLET ORAL EVERY 12 HOURS
Qty: 60 TABLET | Refills: 0 | Status: SHIPPED | OUTPATIENT
Start: 2025-04-26 | End: 2025-04-26

## 2025-04-26 RX ORDER — ESCITALOPRAM OXALATE 5 MG/1
5 TABLET ORAL DAILY
Qty: 30 TABLET | Refills: 0 | Status: SHIPPED | OUTPATIENT
Start: 2025-04-26

## 2025-04-26 RX ADMIN — ESCITALOPRAM OXALATE 10 MG: 10 TABLET ORAL at 08:42

## 2025-04-26 RX ADMIN — MAGNESIUM SULFATE HEPTAHYDRATE 2000 MG: 40 INJECTION, SOLUTION INTRAVENOUS at 05:19

## 2025-04-26 RX ADMIN — POTASSIUM CHLORIDE 40 MEQ: 1500 TABLET, EXTENDED RELEASE ORAL at 05:19

## 2025-04-26 RX ADMIN — LACOSAMIDE 100 MG: 50 TABLET, FILM COATED ORAL at 05:19

## 2025-04-26 RX ADMIN — LEVETIRACETAM 1500 MG: 100 INJECTION INTRAVENOUS at 05:19

## 2025-04-26 ASSESSMENT — VISUAL ACUITY: VA_NORMAL: 1

## 2025-04-26 NOTE — DISCHARGE INSTR - DIET

## 2025-04-26 NOTE — PROGRESS NOTES
Spiritual Health History and Assessment/Progress Note  [facility]    Advance Care Planning,  ,  ,      Name: Ira Downey MRN: [id number]    Age: 34 y.o.     Sex: female   Language: English   Religious: None   Status epilepticus (HCC)     Date: 4/26/2025            Total Time Calculated: (P) 10 min              Spiritual Assessment continued in [facility] ICU 4D        Referral/Consult From: Nurse   Encounter Overview/Reason: Advance Care Planning  Service Provided For: Patient    Tammy, Belief, Meaning:   Patient unable to assess at this time  Family/Friends No family/friends present      Importance and Influence:  Patient unable to assess at this time  Family/Friends No family/friends present    Community:  Patient Other: Unable to evaluate  Family/Friends No family/friends present    Assessment and Plan of Care:     Patient Interventions include: Assisted in Advance Care Planning conversation  Family/Friends Interventions include: No family/friends present    Patient Plan of Care: Spiritual Care available upon further referral  Family/Friends Plan of Care: No family/friends present    Electronically signed by Chaplain Romain on 4/26/2025 at 9:00 AM

## 2025-04-26 NOTE — DISCHARGE INSTRUCTIONS
Take your medication as instructed, Keppra 1500mg twice a day and Vimpat 100mg twice a day. Refrain from driving, climbing, swimming, bathing, operating heavy machinery until event free for 6 consecutive months or until cleared by a neurologist.   Encourage you to increase your daily caloric intake     Call neurology outpatient as soon as possible to schedule an appointment     [facility]   [address] [place], [place] [id number]  [phone]

## 2025-04-26 NOTE — FLOWSHEET NOTE
1700  Pts mother called in and states she faxed papers she has for DPA. Updated on pt condition.    1715  informed Jaquelin that her mother called and was updated but did not inform her of jaquelin's wishes for Donny to be the POA for medical. Jaquelin states she will inform her mother of this.

## 2025-04-26 NOTE — PROGRESS NOTES
Discharged per wheelchair to private auto after discharge instructions reviewed.  Denies any pain or discomfort prior to discharge

## 2025-04-26 NOTE — PLAN OF CARE
Problem: Discharge Planning  Goal: Discharge to home or other facility with appropriate resources  4/26/2025 1140 by Otilia Trinh, RN  Outcome: Progressing     Problem: Pain  Goal: Verbalizes/displays adequate comfort level or baseline comfort level  4/26/2025 1140 by Otilia Trinh, RN  Outcome: Progressing     Problem: Skin/Tissue Integrity  Goal: Skin integrity remains intact  Description: 1.  Monitor for areas of redness and/or skin breakdown2.  Assess vascular access sites hourly3.  Every 4-6 hours minimum:  Change oxygen saturation probe site4.  Every 4-6 hours:  If on nasal continuous positive airway pressure, respiratory therapy assess nares and determine need for appliance change or resting period  4/26/2025 1140 by Otilia Trinh, RN  Outcome: Progressing     Problem: Safety - Adult  Goal: Free from fall injury  4/26/2025 1140 by Otilia Trinh, RN  Outcome: Progressing     Problem: Neurosensory - Adult  Goal: Achieves stable or improved neurological status  4/26/2025 1140 by Otilia Trinh, RN  Outcome: Progressing     Problem: Neurosensory - Adult  Goal: Absence of seizures  4/26/2025 1140 by Otilia Trinh, RN  Outcome: Progressing     Problem: Neurosensory - Adult  Goal: Remains free of injury related to seizures activity  4/26/2025 1140 by Otilia Trinh, RN  Outcome: Progressing     Problem: Neurosensory - Adult  Goal: Achieves maximal functionality and self care  4/26/2025 1140 by Otilia Trinh, RN  Outcome: Progressing     Problem: Metabolic/Fluid and Electrolytes - Adult  Goal: Electrolytes maintained within normal limits  4/26/2025 1140 by Otilia Trinh, RN  Outcome: Progressing     Problem: Metabolic/Fluid and Electrolytes - Adult  Goal: Hemodynamic stability and optimal renal function maintained  4/26/2025 1140 by Otilia Trinh, RN  Outcome: Progressing     Problem: Metabolic/Fluid and Electrolytes - Adult  Goal:

## 2025-04-26 NOTE — ACP (ADVANCE CARE PLANNING)
Advance Care Planning     Advance Care Planning Inpatient Note  [facility] Department    Today's Date: 4/26/2025  Unit: [facility] ICU 4D    Received request from HealthCare Provider.  Upon review of chart and communication with care team, patient's decision making abilities are not in question.. Patient was/were present in the room during visit.    Goals of ACP Conversation:  Discuss advance care planning documents  Facilitate a discussion related to patient's goals of care as they align with the patient's values and beliefs.    Health Care Decision Makers:     No healthcare decision makers have been documented.    Summary:  No Decision Maker named by patient at this time    Advance Care Planning Documents (Patient Wishes):  None     Assessment:  The patient was provided was provided the document and she immediately began reading the document and did not respond while doing so. The  encouraged the patient to call back when she was ready to complete the document.      Interventions:  Patient DECLINED ACP conversation  Declined by not responding after document delivered.     Care Preferences Communicated:   No    Outcomes/Plan:  ACP Discussion: Refused    Electronically signed by Chaplain Romain on 4/26/2025 at 9:01 AM

## 2025-04-26 NOTE — PLAN OF CARE
Problem: Discharge Planning  Goal: Discharge to home or other facility with appropriate resources  Outcome: Progressing     Problem: Pain  Goal: Verbalizes/displays adequate comfort level or baseline comfort level  Outcome: Progressing     Problem: Skin/Tissue Integrity  Goal: Skin integrity remains intact  Description: 1.  Monitor for areas of redness and/or skin breakdown2.  Assess vascular access sites hourly3.  Every 4-6 hours minimum:  Change oxygen saturation probe site4.  Every 4-6 hours:  If on nasal continuous positive airway pressure, respiratory therapy assess nares and determine need for appliance change or resting period  Outcome: Progressing  Flowsheets (Taken 4/25/2025 2000)  Skin Integrity Remains Intact: Monitor for areas of redness and/or skin breakdown     Problem: Safety - Adult  Goal: Free from fall injury  Outcome: Progressing     Problem: Neurosensory - Adult  Goal: Achieves stable or improved neurological status  Outcome: Progressing  Goal: Absence of seizures  Outcome: Progressing  Goal: Remains free of injury related to seizures activity  Outcome: Progressing  Goal: Achieves maximal functionality and self care  Outcome: Progressing     Problem: Metabolic/Fluid and Electrolytes - Adult  Goal: Electrolytes maintained within normal limits  Outcome: Progressing  Goal: Hemodynamic stability and optimal renal function maintained  Outcome: Progressing  Goal: Glucose maintained within prescribed range  Outcome: Progressing     Problem: Hematologic - Adult  Goal: Maintains hematologic stability  Outcome: Progressing

## 2025-04-26 NOTE — PROGRESS NOTES
CRITICAL CARE PROGRESS NOTE      Patient:  Ira Downey    Unit/Bed:4D-07/007-A  YOB: 1990  MRN: [id number]   PCP: No primary care provider on file.  Date of Admission: 4/24/2025  Chief Complaint:- seizures    Assessment and Plan:    Breakthrough tonic-clonic  seizures: 2/2 to known seizure d/o since childhood, breakthroughs unclear etiology, possibly hypoglycemia. MRI of brain showed no acute abnormality. Continuous EGG showed high risk for focal seizure due to right temporal RDA/sharp waves, but no seizures were recorded. Last seizure on 4/24/25.   Continue Keppra 1500 BID & Vimpat  NPO until SLP eval for dysphagia   Keep K >4 and Mg >2. Monitor electrolytes with daily CMP; replacement protocols in place  Neurology consulted, appreciate recommendations  Fall & seizure precautions  Hypoglycemia: blood glucose has been between 57 - 104. Pt reports she eats 2 meals a day and snacks in between. Possible seizures are occurring due to hypoglycemia.   Hypoglycemia protocol in place. Encourage adequate food intake.   Acute cystitis: Pt denies any urinary sx. UA -  15 Ketones, large amount blood (3-5), proteinuria (100), trace amount LE, 8-15 hyaline casts, WBC (25-50), and many bacteria. Started on CTX on 4/24/25. Urine culture showed no significant pathogen possible mixed contamination.   Last dose of rocephin on 4/26/25, no additional abx required patient afebrile, no WBC elevation, asymptotic   Acute on Chronic Normocytic Anemia: 2/2 dilutional from IVF. Baseline Hgb ~11.5, Hgb OA 12.5 dropped to 9.9. No s/sx overt bleeding. No prior iron studies. Can consider if hgb remains low. Continue to monitor w/ CBC.   Hypomagnesemia: 1.5 OA, replaced. Likely 2/2 poor oral intake. Will continue to monitor and replace as needed  NAGMA: likely 2/2 seizures. On IVF w/ LR at 100 cc/hr, will continue. Continue to monitor.   Anxiety/depression:  Continue home Lexapro  B/l hearing loss:  Noted.    INITIAL H AND P  43, AST 17 ALT 14, Tbili 0.5, Tprotein 6.0  WBC 5.1, Hgb 9.9, Hct 29.5, plt 192  Telemetry shows NSR  Urine cx: contaminant growth      Seen with multidisciplinary ICU team.  Meets Continued ICU Level Care Criteria:    [x] Yes   [] No - Transfer Planned to listed location:  [] HOSPITALIST CONTACTED-      Case and plan discussed with  DR. MEMBRENO   Electronically signed by Jan Cantu DO    Addendum by Dr. Maggie MD:  Patient seen by me independently including key components of medical care. Face to face evaluation and examination was performed. Case discussed with Dr. Jan Cantu DO -resident physician. Agree with resident's findings and plan as documented in the resident's note. Italicized font, if present,  represents changes to the note made by me.   More than 50% of the encounter time involved with patient care by the Pulmonary & Critical care service team spent by me.    Please see my modifications mentioned below:  Patient is in no acute distress  She is awake and alert oriented x 3    No results found for: \"PH\", \"PCO2\", \"PO2\", \"HCO3\", \"O2SAT\"  Lab Results   Component Value Date/Time    MODE Not entered 04/24/2025 05:38 AM       CBC:   Recent Labs     04/24/25  1829 04/25/25  0344 04/26/25  0315   WBC 10.2 8.4 5.1   HGB 9.8* 10.9* 9.9*   HCT 29.4* 34.9* 29.5*    179 192     BMP:  Recent Labs     04/24/25  1829 04/24/25  2320 04/25/25  0344 04/25/25  0929 04/26/25  0315     --  139  --  142   K 3.2*  --  3.8  --  3.4*     --  109  --  109   CO2 16*  --  15*  --  22   BUN 6*  --  5*  --  <2*   CREATININE 0.7  --  0.7  --  0.7   GLUCOSE 76  --  57*  --  83   MG 1.9 1.9  --  1.8 1.5*   CALCIUM 7.8*  --  8.2*  --  8.2*     Hepatic:   Recent Labs     04/24/25  0532 04/24/25  1829 04/25/25  0344 04/26/25  0315   AST 37* 23 29 17   ALT 25 16 12 14   BILITOT 0.8 0.5 0.5 0.5   ALKPHOS 62 44 43 43   LIPASE 19.0  --   --   --      Cardiac Enzymes:   Recent Labs     04/24/25  0847

## 2025-04-26 NOTE — FLOWSHEET NOTE
1230  [facility] care notified of pts wishes to do a DPA for medical care.
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
## 2025-04-26 NOTE — PROCEDURES
LONG-TERM EEG-VIDEO MONITORING   CLINICAL NEUROPHYSIOLOGY LABORATORY  DEPARTMENT OF NEUROLOGY  [facility]    Patient: Ira Downey  Age: 34 y.o.  MRN: [id number]    Referring Physician: No ref. provider found  History: The patient is a 34 y.o. female who presented with seizures. This long-term video-EEG monitoring study was performed to evaluate for subclinical seizures. The patient is on neuroactive medications.   Ira Downey   Current Facility-Administered Medications   Medication Dose Route Frequency Provider Last Rate Last Admin    [Held by provider] levETIRAcetam (KEPPRA) tablet 1,000 mg  1,000 mg Oral BID Noah Antonio MD        sodium chloride flush 0.9 % injection 5-40 mL  5-40 mL IntraVENous 2 times per day Noah Antonio MD   10 mL at 04/25/25 2224    sodium chloride flush 0.9 % injection 10 mL  10 mL IntraVENous PRN Noah Antonio MD        0.9 % sodium chloride infusion   IntraVENous PRN Noah Antonio MD        potassium chloride (KLOR-CON M) extended release tablet 40 mEq  40 mEq Oral PRN Noah Antonio MD   40 mEq at 04/26/25 0519    Or    potassium bicarb-citric acid (EFFER-K) effervescent tablet 40 mEq  40 mEq Oral PRN Noah Antonio MD        Or    potassium chloride 10 mEq/100 mL IVPB (Peripheral Line)  10 mEq IntraVENous PRN Noah Antonio MD        magnesium sulfate 2000 mg in 50 mL IVPB premix  2,000 mg IntraVENous PRN Noah Antonio MD 25 mL/hr at 04/26/25 0519 2,000 mg at 04/26/25 0519    enoxaparin (LOVENOX) injection 40 mg  40 mg SubCUTAneous Q24H Noah Antonio MD   40 mg at 04/25/25 2224    ondansetron (ZOFRAN-ODT) disintegrating tablet 4 mg  4 mg Oral Q8H PRN Noah Antonio MD        Or    ondansetron (ZOFRAN) injection 4 mg  4 mg IntraVENous Q6H PRN Noah Antonio MD        polyethylene glycol (GLYCOLAX) packet 17 g  17 g Oral Daily PRN Noah Antonio MD        acetaminophen (TYLENOL) tablet 650 mg  650 mg Oral Q6H PRN

## 2025-04-26 NOTE — DISCHARGE SUMMARY
Thyromegaly.  Lungs: clear to auscultation.  No retractions  Cardiac: RRR.  No JVD.  Abdomen: soft.  Nontender.  Extremities:  No clubbing, cyanosis, or edema x 4.    Vasculature: capillary refill < 3 seconds. Palpable dorsalis pedis pulses.  Skin:  warm and dry.  Psych:  Alert and oriented x3.  Affect appropriate   Lymph:  No supraclavicular adenopathy.  Neurologic:  No focal deficit. No seizures.      Diagnostic Data:  No results found for: \"PHART\", \"TTK6PWM\", \"PO2ART\", \"OTO9GJZ\", \"Y0UUGOEG\"  WBC   Date Value Ref Range Status   04/26/2025 5.1 4.8 - 10.8 thou/mm3 Final     Hemoglobin   Date Value Ref Range Status   04/26/2025 9.9 (L) 12.0 - 16.0 gm/dl Final     Hematocrit   Date Value Ref Range Status   04/26/2025 29.5 (L) 37.0 - 47.0 % Final     Platelets   Date Value Ref Range Status   04/26/2025 192 130 - 400 thou/mm3 Final     Sodium   Date Value Ref Range Status   04/26/2025 142 135 - 145 meq/L Final     Potassium reflex Magnesium   Date Value Ref Range Status   04/26/2025 3.4 (L) 3.5 - 5.2 meq/L Final     Chloride   Date Value Ref Range Status   04/26/2025 109 98 - 111 meq/L Final     CO2   Date Value Ref Range Status   04/26/2025 22 22 - 29 meq/L Final     BUN   Date Value Ref Range Status   04/26/2025 <2 (L) 8 - 23 mg/dL Final     Creatinine   Date Value Ref Range Status   04/26/2025 0.7 0.5 - 0.9 mg/dL Final     Glucose   Date Value Ref Range Status   04/26/2025 83 74 - 109 mg/dL Final     Magnesium   Date Value Ref Range Status   04/26/2025 1.5 (L) 1.6 - 2.6 mg/dL Final     Comment:     Performed at [facility] [address] [place] [address]     Calcium   Date Value Ref Range Status   04/26/2025 8.2 (L) 8.6 - 10.0 mg/dL Final     AST   Date Value Ref Range Status   04/26/2025 17 10 - 35 U/L Final     ALT   Date Value Ref Range Status   04/26/2025 14 10 - 35 U/L Final     Comment:     Performed at [facility] [address] [place] [address]     Total Bilirubin   Date Value Ref Range

## 2025-04-26 NOTE — PROGRESS NOTES
volume is normal. There is no hemorrhage. There are no intra-or extra-axial collections.  There is no hydrocephalus, midline shift or mass effect.  The gray-white matter differentiation is preserved. The paranasal sinuses and mastoid air cells are normally aerated.  There is no suspicious calvarial abnormality.      Normal CT appearance of the brain. **This report has been created using voice recognition software. It may contain minor errors which are inherent in voice recognition technology.** Electronically signed by Dr. No Mendes    XR CHEST PORTABLE  Result Date: 4/24/2025  1 view chest x-ray. Comparison: None Findings: Normal lung volumes. Lungs are clear. No pneumothorax or pleural effusion. Heart size normal. No passive venous congestion. No midline shift or tracheal deviation. No acute fracture. No radiopaque foreign body demonstrated     Impression: 1. No radiopaque foreign body demonstrated This document has been electronically signed by: Ryan Garber MD on 04/24/2025 06:27 AM        Assessment and Plan        Hospital Problems           Last Modified POA    * (Principal) Status epilepticus (HCC) 4/24/2025 Yes    Abnormal EEG 4/24/2025 Yes         Breakthrough Seizures   Hx of seizures since childhood. Followed with Dr. Pandey but was discharged from their practice due to no-show for appointments. Patient has insecure transportation.   Patient reports compliance with home regimen.     Estimated time of seizure >40 minutes on the morning of presentation.     MRI brain w/wo (5/25/25) negative   Continuous EEG revealed   4/24/25 @1356: [id number]: Right frontotemporal onset electroclinical seizure   4/24/25 @1717: [id number]: Right frontotemporal onset electroclinical seizure   4/24/25 @2302:      41sec: Right frontotemporal onset electroclinical seizure       In the ED she was given 5 mg IV versed x2, 2g IV mag, and loaded with keppra 60 mg/kg (3.8g).  Home Keppra 1g BID was then optimized to 1.5g

## 2025-04-27 LAB
BACTERIA BLD AEROBE CULT: NORMAL
BACTERIA BLD AEROBE CULT: NORMAL

## 2025-04-30 LAB
BACTERIA BLD AEROBE CULT: NORMAL
BACTERIA BLD AEROBE CULT: NORMAL

## 2025-05-05 ENCOUNTER — OFFICE VISIT (OUTPATIENT)
Dept: INTERNAL MEDICINE CLINIC | Age: 35
End: 2025-05-05
Payer: COMMERCIAL

## 2025-05-05 VITALS
WEIGHT: 137.6 LBS | SYSTOLIC BLOOD PRESSURE: 125 MMHG | HEART RATE: 99 BPM | OXYGEN SATURATION: 99 % | HEIGHT: 67 IN | BODY MASS INDEX: 21.6 KG/M2 | DIASTOLIC BLOOD PRESSURE: 80 MMHG

## 2025-05-05 DIAGNOSIS — R56.9 SEIZURES (HCC): Primary | ICD-10-CM

## 2025-05-05 DIAGNOSIS — F41.9 ANXIETY: ICD-10-CM

## 2025-05-05 DIAGNOSIS — F32.A DEPRESSION, UNSPECIFIED DEPRESSION TYPE: ICD-10-CM

## 2025-05-05 PROCEDURE — 99213 OFFICE O/P EST LOW 20 MIN: CPT

## 2025-05-05 SDOH — ECONOMIC STABILITY: FOOD INSECURITY: WITHIN THE PAST 12 MONTHS, YOU WORRIED THAT YOUR FOOD WOULD RUN OUT BEFORE YOU GOT MONEY TO BUY MORE.: NEVER TRUE

## 2025-05-05 SDOH — ECONOMIC STABILITY: FOOD INSECURITY: WITHIN THE PAST 12 MONTHS, THE FOOD YOU BOUGHT JUST DIDN'T LAST AND YOU DIDN'T HAVE MONEY TO GET MORE.: NEVER TRUE

## 2025-05-05 ASSESSMENT — ENCOUNTER SYMPTOMS
CHEST TIGHTNESS: 0
SHORTNESS OF BREATH: 0

## 2025-05-05 ASSESSMENT — PATIENT HEALTH QUESTIONNAIRE - PHQ9
SUM OF ALL RESPONSES TO PHQ QUESTIONS 1-9: 0
1. LITTLE INTEREST OR PLEASURE IN DOING THINGS: NOT AT ALL
SUM OF ALL RESPONSES TO PHQ QUESTIONS 1-9: 0
2. FEELING DOWN, DEPRESSED OR HOPELESS: NOT AT ALL

## 2025-05-05 NOTE — PROGRESS NOTES
1990    Chief Complaint   Patient presents with    Follow-Up from Hospital     Abnormal EEG    New Patient    Referral - General     Neurology       Pt is a 34 y.o. female who presents for an initial visit.  She is a previous patient of Dr. Park.    HPI  Patient is a 34 year old female, medical history of seizures, depression/anxiety, and bilateral hearing loss, previously followed with Dr. Pandey's office. Admitted to [facility] from 4/24 to 4/26 for status epilepticus. Non-compliant with home antiseizure medications. Seen by neurology while inpatient, who recommend increase in Keppra to 1.5g BID as well as starting Vimpat 100mg BID.    Patient is still currently taking Vimpat, but has not had refill for Keppra at this time. Patient was also on Lexapro, but stopped taking it after [facility] ICU discharge.    Patient also notes that she smokes marijuana three times daily. States that it helps with her anxiety. Informed patient that this might be interfering with her Keppra, which could explain why she is having breakthrough seizures.    Scheduled to see Loni at [facility], appointment scheduled for 5/6/2025 for neurology evaluation.      Past Medical History:   Diagnosis Date    Anxiety     Depression     Hearing loss     Bilateral    Seizures (HCC)        No past surgical history on file.    Current Outpatient Medications   Medication Sig Dispense Refill    escitalopram (LEXAPRO) 5 MG tablet Take 1 tablet by mouth daily 30 tablet 0    levETIRAcetam (KEPPRA) 1000 MG tablet Take 1.5 tablets by mouth 2 times daily 60 tablet 0    lacosamide (VIMPAT) 100 MG TABS tablet Take 1 tablet by mouth in the morning and 1 tablet in the evening. Do all this for 30 days. Max Daily Amount: 200 mg. 60 tablet 0     No current facility-administered medications for this visit.       Allergies   Allergen Reactions    Shrimp (Diagnostic)        Social History     Socioeconomic History    Marital status:

## 2025-05-05 NOTE — PATIENT INSTRUCTIONS
Patient to take Keppra 1500mg twice a day and Vimpat 100mg twice a day.    Discussed concerns for marijuana use, educated on concerns of possible interaction with Keppra.    Patient okay to resume prior Lexapro at this time. Will need blood work to ensure no electrolyte problems and EKG in office at next appointment.    Will get repeat lab work for electrolyte panel and blood count to ensure no issues now that you are out of the hospital.    Okay to follow back as needed or if patient does not want to follow with [address] [place] [address] clinic.

## 2025-06-05 ENCOUNTER — OFFICE VISIT (OUTPATIENT)
Dept: INTERNAL MEDICINE CLINIC | Age: 35
End: 2025-06-05
Payer: COMMERCIAL

## 2025-06-05 VITALS
HEIGHT: 67 IN | DIASTOLIC BLOOD PRESSURE: 81 MMHG | SYSTOLIC BLOOD PRESSURE: 128 MMHG | HEART RATE: 65 BPM | BODY MASS INDEX: 20.72 KG/M2 | WEIGHT: 132 LBS | OXYGEN SATURATION: 99 %

## 2025-06-05 DIAGNOSIS — F32.A DEPRESSION, UNSPECIFIED DEPRESSION TYPE: ICD-10-CM

## 2025-06-05 DIAGNOSIS — Z86.69 HISTORY OF SEIZURE DISORDER: Primary | ICD-10-CM

## 2025-06-05 DIAGNOSIS — F41.9 ANXIETY: ICD-10-CM

## 2025-06-05 PROCEDURE — 99212 OFFICE O/P EST SF 10 MIN: CPT

## 2025-06-05 RX ORDER — LEVETIRACETAM 1000 MG/1
1500 TABLET ORAL 2 TIMES DAILY
Qty: 60 TABLET | Refills: 0 | Status: CANCELLED | OUTPATIENT
Start: 2025-06-05

## 2025-06-05 NOTE — PATIENT INSTRUCTIONS
Continue to monitor marijuana use, which can effect seizure medication and episodes of breakthrough seizures.    Will continue to follow until you can establish with [facility] neurology department.    Forward prior lab work to our office

## 2025-06-05 NOTE — PROGRESS NOTES
1990    Chief Complaint   Patient presents with    Seizures    Depression    Anxiety     HPI  Patient is a 34-year-old female, medical history of seizures, depression/anxiety, and bilateral hearing loss, who presents to clinic today for follow up visit from 5/6/2025. Patient previously admitted to [facility] from 4/24 to 4/26 for status epilepticus, 2/2 medication non-compliance. Since initial visit on 5/6/2025, patient has been compliant with increased dose of Keppra and Vimpat. Patient denies any further seizures episodes since last month. Of note, patient was previously using marijuana and states that she has been decreasing cannabinoid use as recommended, but states she takes one edible approximately once a week to help with anxiety.    Patient was also previously referred to [facility] neurology, but is not able to see them until October 2025.    Patient is also being seen at the [address] [place] [address] and would prefer to have primary care at their facility.    Patient has no other concerns or complaints at this time.    Past Medical History:   Diagnosis Date    Anxiety     Depression     Hearing loss     Bilateral    Seizures (HCC)        No past surgical history on file.    Current Outpatient Medications   Medication Sig Dispense Refill    escitalopram (LEXAPRO) 5 MG tablet Take 1 tablet by mouth daily 30 tablet 0    levETIRAcetam (KEPPRA) 1000 MG tablet Take 1.5 tablets by mouth 2 times daily 60 tablet 0    lacosamide (VIMPAT) 100 MG TABS tablet Take 1 tablet by mouth in the morning and 1 tablet in the evening. Do all this for 30 days. Max Daily Amount: 200 mg. 60 tablet 0     No current facility-administered medications for this visit.       Allergies   Allergen Reactions    Shrimp (Diagnostic)        Social History     Socioeconomic History    Marital status: Single     Spouse name: Not on file    Number of children: Not on file    Years of education: Not on file    Highest education level: Not on file   Occupational